# Patient Record
Sex: MALE | Race: WHITE | NOT HISPANIC OR LATINO | Employment: OTHER | URBAN - METROPOLITAN AREA
[De-identification: names, ages, dates, MRNs, and addresses within clinical notes are randomized per-mention and may not be internally consistent; named-entity substitution may affect disease eponyms.]

---

## 2017-01-23 ENCOUNTER — SLEEP CENTER VISIT (OUTPATIENT)
Dept: SLEEP MEDICINE | Facility: MEDICAL CENTER | Age: 72
End: 2017-01-23
Payer: MEDICARE

## 2017-01-23 VITALS
WEIGHT: 215 LBS | RESPIRATION RATE: 16 BRPM | DIASTOLIC BLOOD PRESSURE: 78 MMHG | BODY MASS INDEX: 30.78 KG/M2 | HEIGHT: 70 IN | HEART RATE: 92 BPM | SYSTOLIC BLOOD PRESSURE: 116 MMHG

## 2017-01-23 DIAGNOSIS — G47.33 OBSTRUCTIVE SLEEP APNEA HYPOPNEA, MILD: ICD-10-CM

## 2017-01-23 PROCEDURE — 99214 OFFICE O/P EST MOD 30 MIN: CPT | Performed by: INTERNAL MEDICINE

## 2017-01-23 RX ORDER — ZOLPIDEM TARTRATE 5 MG/1
5 TABLET ORAL NIGHTLY PRN
Qty: 3 TAB | Refills: 0 | Status: SHIPPED | OUTPATIENT
Start: 2017-01-23

## 2017-01-23 NOTE — PATIENT INSTRUCTIONS
1.  We have changed the AutoPap pressure to between 5 and 16 cm of water  2. We have scheduled a CPAP titration study  3. We have prescribed Ambien that can be used as needed for the sleep study

## 2017-01-23 NOTE — PROGRESS NOTES
Chito Pham is a 71 y.o. male here for objective sleep apnea.      History of Present Illness:    The patient is a 71-year-old male who comes in for evaluation of sleep apnea. He is previously seen Dr. Marr for a lung nodule. The plan is for never repeat CT scan of the chest in March 2017. She is a . He had a home sleep study done in 2014 which showed mild sleep apnea with an apnea hypopnea index of 6.3 per hour and a low oxygen saturation of 72%. E he was given a machine to use. It's an AutoPap machine set between 12 and 16 cm water. He has not really used it. However he underwent a colonoscopy recently and he was told by the gastrologist that he had sleep apnea. He says really tried to use the AutoPap machine. He does bring in a  card which shows about 4 days of use and an apnea hypopnea index of 18.8 per hour and he has used the machine on average for 2 hours and 16 minutes. He denies any daytime fatigue or sleepiness. He denies any other types of sleep problems such as restless legs or nocturnal kicking or jerking. He has never had a CPAP titration performed. He has a history of gastroesophageal reflux disease. He scores a 5 on the [place] sleepiness scale. He denies nocturnal leg kicking he denies sleepwalking or seizures and does not physically active his dreams. He typically has no trouble going to sleep and will go to bed at 9:00 at night and falls asleep within 5 minutes and then he'll wake up at 6 the morning feeling rested.    Constitutional:  Negative for fever, chills, sweats, and fatigue.  Eyes:  Negative for eye pain and visual changes.  HENT:  Negative for tinnitus and hoarse voice.  Cardiovascular:  Negative for chest pain, leg swelling, syncope and orthopnea.  Respiratory:  See HPI for pertinent negatives  Sleep:  Negative for somnolence, loud snoring, sleep disturbance due to breathing, insomnia.  Gastrointestinal:  Negative for dysphagia, nausea and abdominal  pain.  Heme/lymph:  Denies easy bruising, blood clots.  Musculoskeletal:  Negative for arthralgias, sore muscles and back pain.  Skin:  Negative for rash and color change.  Neurological:  Negative for headaches, lightheadedness and weakness.  Psychiatric:  Denies depression.    Current Outpatient Prescriptions   Medication Sig Dispense Refill   • zolpidem (AMBIEN) 5 MG Tab Take 1 Tab by mouth at bedtime as needed for Sleep (insomnia). Take 1 tab at bedtime as needed for insomnia. 3 Tab 0   • ursodiol (KARYN 250) 250 MG Tab Take 500 mg by mouth 2 times a day.     • omeprazole (PRILOSEC) 20 MG CPDR Take 40 mg by mouth every day.     • ciprofloxacin (CIPRO) 400 MG/200ML SOLN ivpb 400 mg by Intravenous route every 12 hours.     • HYDROmorphone (DILAUDID) 1 MG/ML SOLN 1 mg by Intravenous route every 3 hours as needed.     • metronidazole (FLAGYL) 5-0.79 MG/ML-% SOLN 500 mg by Intravenous route every 8 hours.     • phytonadione (MEPHYTON) 5 MG TABS Take 10 mg by mouth Once.     • prochlorperazine (COMPAZINE) 5 MG TABS Take 5 mg by mouth 3 times a day as needed.     • pravastatin (PRAVACHOL) 20 MG TABS Take 1.5 Tabs by mouth every evening. (Patient not taking: Reported on 1/23/2017) 135 Each 3   • finasteride (PROSCAR) 5 MG TABS Take 5 mg by mouth every day.     • terazosin (HYTRIN) 2 MG CAPS Take 2 mg by mouth every evening.     • aspirin EC (ECOTRIN) 81 MG TBEC Take 81 mg by mouth every day.     • MULTIPLE VITAMIN PO Take  by mouth.       No current facility-administered medications for this visit.       Social History   Substance Use Topics   • Smoking status: Former Smoker -- 0.50 packs/day for 40 years     Quit date: 08/31/2001   • Smokeless tobacco: Never Used   • Alcohol Use: 0.6 oz/week     1 Glasses of wine per week      Comment: 1 glass of wine each week, occasionally       Past Medical History   Diagnosis Date   • CAD (coronary artery disease) 8/31/2011   • COPD (chronic obstructive pulmonary disease) (CMS-[facility])   "  • Emphysema lung (CMS-HCC)    • Benign prostatic hypertrophy    • Thrombocytopenia (CMS-HCC)    • Pulmonary nodule    • Chickenpox    • French measles    • Mumps    • Influenza    • Tonsillitis        Past Surgical History   Procedure Laterality Date   • Umbilical hernia repair  4/17/08     Performed by MIGUEL A BETANCOURT at [facility] ORS   • Hernia repair       BILATERAL   • Egd with asp/bx  3/13/2012     Performed by RANI NÚÑEZ at SURGERY [facility] ORS   • Gastroscopy with biopsy  3/13/2012     Performed by RANI NÚÑEZ at SURGERY [facility] ORS   • Ercp w/sphincterotomy/papill.  3/13/2012     Performed by RANI NÚÑEZ at [facility] ORS   • Cholecystectomy     • Tonsillectomy     • Appendectomy     • Urology surgery         Allergies:  Review of patient's allergies indicates no known allergies.    Family History   Problem Relation Age of Onset   • Lung Disease Mother    • Lung Cancer Mother    • Lung Disease Father    • Respiratory Disease Father        Physical Examination    Filed Vitals:    01/23/17 1318   Height: 1.778 m (5' 10\")   Weight: 97.523 kg (215 lb)   Weight % change since last entry.: 0 %   BP: 116/78   Pulse: 92   BMI (Calculated): 30.85   Resp: 16   O2 sat % room air: 96 %   Neck circumference: 16       Physical Exam:  Constitutional:  Well developed and well nourished.  Head:  Normocephalic and atraumatic.  Nose:  Nose normal.  Mouth/Throat:  Oropharynx is clear and moist, no lesions.    Neck:  Normal range of motion.  Supple.  No JVD.  Cardiovascular:  Normal rate, regular rhythm, normal heart sounds. No edema  Pulmonary/Chest: No wheezing, rales or rhonchi.  Respiratory effort non labored  Musculoskeletal.  No muscular atrophy.  Lymphadenopathy:  No cervical or supraclavicular adenopathy  Neurological:  Alert and oriented.  Cranial nerves intact.  No focal deficits  Skin:  No rashes or ulcers.  Psyciatric:  Normal mood and affect.      Assessment and " Plan:  1. Obstructive sleep apnea hypopnea, mild  The patient has mild sleep apnea diagnosed on a home study 3 years ago. I suspect that his sleep apnea is more significant at this time. The patient would like to be treated and is willing to use CPAP therapy. I made adjustments to his AutoPap machine starting at 5 cm of water and up to 16 cm of water. I've also scheduled him for CPAP titration. We will make adjustments to his CPAP machine as necessary based on the results of the sleep study. He was advised to avoid alcohol and sedatives and to not operate a motor vehicle while drowsy. He was advised that he needed to comply with FAA rules. We will see him back after the sleep study.  - POLYSOMNOGRAPHY TITRATION; Future  - zolpidem (AMBIEN) 5 MG Tab; Take 1 Tab by mouth at bedtime as needed for Sleep (insomnia). Take 1 tab at bedtime as needed for insomnia.  Dispense: 3 Tab; Refill: 0        Followup Return for after sleep study, follow up with the sleep physician.    Answers for HPI/ROS submitted by the patient on 1/19/2017   Year of your last physical exam: 2016  Results of exam: good  Occupation : Retired  Height: 5'10''  Current weight: 210  6 months ago: 210  At age 21: 185  What is the reason for your visit today?: consultation  Name of person referring you to the Sleep Center: Houston Valdez  Have you ever been hospitalized?: Yes  Reason, year, and hospital in which you were hospitalized:: Gallstones  Have you ever had problems with anesthesia?: No  Have you experienced post-operative delirium?: No  Any complications with surgery?: No  What year did you receive your last Flu shot?: 2016  What year did you receive you last Pneumonia shot?: 2016  Please briefly describe your sleep problem and how old you were when it began.: MARTHA  How does this affect your daily life and activities? Please also rate how serious of a problem this is (1 = Not at all, 10 = Very Serious).: 2  Have you had any previous evaluations,  examinations, or treatment for this sleep problem or any other problems with your sleep? If so, please describe the evaluation, treatment, and results.: CPAP  What time do you usually go to bed and wake up on: Weekdays? Weekends?: 10PM 6AM  Do you have a regular bed partner?: Yes  How many minutes does it usually take to fall asleep at night after turning off the lights?: 5-10  What do you ordinarily do just prior to turning out the lights and attempting to go to sleep (e.g., reading, TV, baths, etc.)?: TV  On average, how many times do you wake up during the night?: 5  On average, how many times do you wake up to use the bathroom?: 1  Do you often wake up too early in the morning and are unable to return to sleep?: no  On average, how many hours of sleep do you get per night?: 5-6  How do you usually awaken?  Alarm, spontaneously, or other?: spontaneous  Is it difficult for you to awaken and get out of bed after sleeping? (Not at all, Sometimes, Very): no  Do you nap or return to bed after arising?: no  Are you bothered by sleepiness during the day?: no  Do you feel that you get too much sleep at night?: no  Do you feel that you get too little sleep at night?: sometimes  Do you usually feel tired during the day? If so, what do you attribute this to?: no  Do you find yourself falling asleep when you don't mean to? : no  Have you ever suddenly fallen?: no  Have you ever experienced sudden body weakness?: no  Have you ever experienced weakness or paralysis upon going to sleep?: no  Have you ever experienced weakness or paralysis upon awakening from sleep?: no  Have you ever experienced seeing things or hearing voices/noises: That weren't real? On going to sleep? During the night? On awakening from sleep? During the day?: no  Do you have difficulty breathing at night? If yes, briefly describe.: no  Have you been told you snore while asleep? If so, does it disturb a bed partner (or someone in the same room), or someone  "in the next room?: yes  Have you ever experienced doing something without being aware of the action? If yes, please describe.: no  Have you ever experienced upon lying in bed before sleep or on awakening from sleep: Restlessness of legs, \"nervous legs\", \"creeping crawling\" sensation of legs, or twitching of legs?: no  Have you ever been told that your arms or legs jerk or twitch while you are asleep? If yes, how many times per night does this occur?: no  Do you know, or have you ever been told that you do any of the following while sleeping: talk, walk, grit teeth, wet the bed, wake up screaming or seemingly afraid, have disturbing dreams, have unusual movements, wake up with headaches, (males) have erections? If yes to any of these, please indicate how many times per week, age started, last occurrence, treatment received.: no  Has anyone in your family been known to have any sleep problems? If yes, please list the type of problem (e.g., trouble getting to sleep, too sleepy, bed wetting, etc.), the relationship of this person to you, and the treatment received.: no      "

## 2017-01-23 NOTE — MR AVS SNAPSHOT
"Chito Pham   2017 1:20 PM   Sleep Center Visit   MRN: [id number]    Department:  [facility]   Dept Phone:  [phone]    Description:  Male : 1945   Provider:  Mal Turk M.D.           Reason for Visit     New Patient MARTHA      Allergies as of 2017     No Known Allergies      You were diagnosed with     Obstructive sleep apnea hypopnea, mild   [[id number]]         Vital Signs     Blood Pressure Pulse Respirations Height Weight Body Mass Index    116/78 mmHg 92 16 1.778 m (5' 10\") 97.523 kg (215 lb) 30.85 kg/m2    Smoking Status                   Former Smoker           Basic Information     Date Of Birth Sex Race Ethnicity Preferred Language    1945 Male White Non- English      Your appointments     2017  7:05 PM   Sleep Study with SLEEP TECH   [facility] (--)    [address]  [place] [id number] [phone]            2017  3:20 PM   Follow UP with [facility] (--)    [address]  [place] [id number] [phone]              Problem List              ICD-10-CM Priority Class Noted - Resolved    CAD (coronary artery disease) I25.10   2011 - Present    Dyslipidemia E78.5   2011 - Present      Health Maintenance        Date Due Completion Dates    IMM DTaP/Tdap/Td Vaccine (1 - Tdap) 1965 ---    COLONOSCOPY 1996 ---    IMM ZOSTER VACCINE 2006 ---    IMM PNEUMOCOCCAL 65+ (ADULT) LOW/MEDIUM RISK SERIES (1 of 2 - PCV13) 2011 ---    IMM INFLUENZA (1) 2016 ---            Current Immunizations     No immunizations on file.      Below and/or attached are the medications your provider expects you to take. Review all of your home medications and newly ordered medications with your provider and/or pharmacist. Follow medication instructions as directed by your provider and/or pharmacist. Please keep your medication list with " you and share with your provider. Update the information when medications are discontinued, doses are changed, or new medications (including over-the-counter products) are added; and carry medication information at all times in the event of emergency situations     Allergies:  No Known Allergies          Medications  Valid as of: January 23, 2017 -  2:24 PM    Generic Name Brand Name Tablet Size Instructions for use    Aspirin (Tablet Delayed Response) ECOTRIN 81 MG Take 81 mg by mouth every day.        Ciprofloxacin in D5W (Solution) CIPRO 400 MG/200ML 400 mg by Intravenous route every 12 hours.        Finasteride (Tab) PROSCAR 5 MG Take 5 mg by mouth every day.        HYDROmorphone HCl (Solution) DILAUDID 1 MG/ML 1 mg by Intravenous route every 3 hours as needed.        MetroNIDAZOLE in NaCl (Solution) FLAGYL 5-0.79 MG/ML-% 500 mg by Intravenous route every 8 hours.        Multiple Vitamin   Take  by mouth.        Omeprazole (CAPSULE DELAYED RELEASE) PRILOSEC 20 MG Take 40 mg by mouth every day.        Phytonadione (Tab) MEPHYTON 5 MG Take 10 mg by mouth Once.        Pravastatin Sodium (Tab) PRAVACHOL 20 MG Take 1.5 Tabs by mouth every evening.        Prochlorperazine Maleate (Tab) COMPAZINE 5 MG Take 5 mg by mouth 3 times a day as needed.        Terazosin HCl (Cap) HYTRIN 2 MG Take 2 mg by mouth every evening.        Ursodiol (Tab) KARYN 250 250 MG Take 500 mg by mouth 2 times a day.        Zolpidem Tartrate (Tab) AMBIEN 5 MG Take 1 Tab by mouth at bedtime as needed for Sleep (insomnia). Take 1 tab at bedtime as needed for insomnia.        .                 Medicines prescribed today were sent to:     [facility] - [address]    [facility] [place] [address]    Phone: [phone] Fax: [phone]    Open 24 Hours?: No      Medication refill instructions:       If your prescription bottle indicates you have medication refills left, it is not necessary to call your provider’s  office. Please contact your pharmacy and they will refill your medication.    If your prescription bottle indicates you do not have any refills left, you may request refills at any time through one of the following ways: The online reportbrain system (except Urgent Care), by calling your provider’s office, or by asking your pharmacy to contact your provider’s office with a refill request. Medication refills are processed only during regular business hours and may not be available until the next business day. Your provider may request additional information or to have a follow-up visit with you prior to refilling your medication.   *Please Note: Medication refills are assigned a new Rx number when refilled electronically. Your pharmacy may indicate that no refills were authorized even though a new prescription for the same medication is available at the pharmacy. Please request the medicine by name with the pharmacy before contacting your provider for a refill.        Your To Do List     Future Labs/Procedures Complete By Expires    POLYSOMNOGRAPHY TITRATION  As directed 1/23/2018      Instructions    1.  We have changed the AutoPap pressure to between 5 and 16 cm of water  2. We have scheduled a CPAP titration study  3. We have prescribed Ambien that can be used as needed for the sleep study          reportbrain Access Code: Activation code not generated  Current reportbrain Status: Active

## 2017-02-13 ENCOUNTER — TELEPHONE (OUTPATIENT)
Dept: SLEEP MEDICINE | Facility: MEDICAL CENTER | Age: 72
End: 2017-02-13

## 2017-02-13 DIAGNOSIS — R91.8 ABNORMAL CT SCAN, LUNG: ICD-10-CM

## 2017-02-13 NOTE — TELEPHONE ENCOUNTER
Pt is scheduled for a CT on 2/23/17, they are requiring BUN and Creatinine blood work before he can get CT. Please place order for labs, he is going to a [facility] lab. Thank you.

## 2017-02-19 ENCOUNTER — SLEEP STUDY (OUTPATIENT)
Dept: SLEEP MEDICINE | Facility: MEDICAL CENTER | Age: 72
End: 2017-02-19
Attending: INTERNAL MEDICINE
Payer: MEDICARE

## 2017-02-19 DIAGNOSIS — G47.33 OBSTRUCTIVE SLEEP APNEA HYPOPNEA, MILD: ICD-10-CM

## 2017-02-19 PROCEDURE — 95811 POLYSOM 6/>YRS CPAP 4/> PARM: CPT | Performed by: INTERNAL MEDICINE

## 2017-02-21 ENCOUNTER — HOSPITAL ENCOUNTER (OUTPATIENT)
Dept: LAB | Facility: MEDICAL CENTER | Age: 72
End: 2017-02-21
Attending: NURSE PRACTITIONER
Payer: MEDICARE

## 2017-02-21 DIAGNOSIS — R91.8 ABNORMAL CT SCAN, LUNG: ICD-10-CM

## 2017-02-21 LAB
BUN SERPL-MCNC: 18 MG/DL (ref 8–22)
CREAT SERPL-MCNC: 1.07 MG/DL (ref 0.5–1.4)

## 2017-02-21 PROCEDURE — 82565 ASSAY OF CREATININE: CPT

## 2017-02-21 PROCEDURE — 36415 COLL VENOUS BLD VENIPUNCTURE: CPT

## 2017-02-21 PROCEDURE — 84520 ASSAY OF UREA NITROGEN: CPT

## 2017-02-21 NOTE — PROCEDURES
Clinical Comments:  The patient underwent a comprehensive polysomnogram using the standard montage for measurement of parameters of sleep, respiratory events, movement abnormalities, heart rate and rhythm. A microphone was used to monitor snoring.      INTERPRETATION:  The total recording time was 380.7 minutes with a sleep period of 373.6 minutes and the total sleep time was 251.5 minutes with a sleep efficiency of 66.1%.  The sleep latency was 7.1 minutes, and REM latency was 78.0 minutes.  The patient experienced 179 arousals in total, for an arousal index of 42.7    RESPIRATORY: The patient had 54 apneas in total.  Of these, 0 were obstructive apneas, and 54 were central apneas.  This resulted in an apnea index (AI) of 12.9.  The patient had 18 hypopneas, for a hypopnea index of 4.3.  The overall AHI was 17.2, while the AHI during Stage R sleep was 4.5.  AHI while supine was 20.8.    OXIMETRY: Oxygen saturation monitoring showed a mean SpO2 of 94.2%, with a minimum oxygen saturation of 89.0%.  Oxygen saturations were less than or = 89% for 0.0 minutes of sleep time.    CARDIAC: The highest heart rate during the recording was 97.0 beats per minute.  The average heart rate during sleep was 74.8 bpm.    LIMB MOVEMENTS: There were a total of 340 PLMs during sleep, of which 85 were PLMs arousals.  This resulted in a PLMS index of 81.1.    CPAP was tried from 5cm to 9cm H2O. Bilevel was tried from 12/8cm H2O to a final pressure of 19/14cm H2O.     Sleep study was accomplished on February 19, 2017. CPAP titration ranged between 5 and 9 cm, and then bilevel was utilized up to 19/14. This complex titration study yielded best results at the higher levels of BiPAP, at 19/14 the patient's index was zero, saturations were maintained and REM sleep was achieved.

## 2017-02-23 ENCOUNTER — HOSPITAL ENCOUNTER (OUTPATIENT)
Dept: RADIOLOGY | Facility: MEDICAL CENTER | Age: 72
End: 2017-02-23
Attending: INTERNAL MEDICINE
Payer: MEDICARE

## 2017-02-23 DIAGNOSIS — R93.89 ABNORMAL CHEST X-RAY: ICD-10-CM

## 2017-02-23 PROCEDURE — 71260 CT THORAX DX C+: CPT

## 2017-02-23 PROCEDURE — 700117 HCHG RX CONTRAST REV CODE 255: Performed by: INTERNAL MEDICINE

## 2017-02-23 RX ADMIN — IOHEXOL 75 ML: 350 INJECTION, SOLUTION INTRAVENOUS at 13:14

## 2017-02-27 ENCOUNTER — SLEEP CENTER VISIT (OUTPATIENT)
Dept: SLEEP MEDICINE | Facility: MEDICAL CENTER | Age: 72
End: 2017-02-27
Payer: MEDICARE

## 2017-02-27 VITALS
TEMPERATURE: 98.1 F | WEIGHT: 217 LBS | HEART RATE: 95 BPM | SYSTOLIC BLOOD PRESSURE: 104 MMHG | OXYGEN SATURATION: 96 % | DIASTOLIC BLOOD PRESSURE: 70 MMHG | HEIGHT: 70 IN | RESPIRATION RATE: 16 BRPM | BODY MASS INDEX: 31.07 KG/M2

## 2017-02-27 DIAGNOSIS — R93.89 ABNORMAL CHEST X-RAY: ICD-10-CM

## 2017-02-27 DIAGNOSIS — G47.33 OBSTRUCTIVE SLEEP APNEA SYNDROME: ICD-10-CM

## 2017-02-27 DIAGNOSIS — G47.10 HYPERSOMNOLENCE: ICD-10-CM

## 2017-02-27 PROCEDURE — 99214 OFFICE O/P EST MOD 30 MIN: CPT | Performed by: INTERNAL MEDICINE

## 2017-02-27 RX ORDER — AMPICILLIN TRIHYDRATE 250 MG
CAPSULE ORAL
Status: ON HOLD | COMMUNITY
End: 2021-06-18

## 2017-02-27 RX ORDER — DIMENHYDRINATE 50 MG
TABLET ORAL
COMMUNITY
End: 2021-06-09

## 2017-02-27 NOTE — MR AVS SNAPSHOT
"        Chito Pham   2017 3:20 PM   Sleep Center Visit   MRN: [id number]    Department:  [facility]   Dept Phone:  [phone]    Description:  Male : 1945   Provider:  Tyler DYE M.D.           Reason for Visit     Results CT Chest, BUN/Creatinine, Sleep Study      Allergies as of 2017     No Known Allergies      You were diagnosed with     Obstructive sleep apnea syndrome   [339774]       Hypersomnolence   [[id number]]       Abnormal chest x-ray   [[id number]]         Vital Signs     Blood Pressure Pulse Temperature Respirations Height Weight    104/70 mmHg 95 36.7 °C (98.1 °F) 16 1.778 m (5' 10\") 98.431 kg (217 lb)    Body Mass Index Oxygen Saturation Smoking Status             31.14 kg/m2 96% Former Smoker         Basic Information     Date Of Birth Sex Race Ethnicity Preferred Language    1945 Male White Non- English      Problem List              ICD-10-CM Priority Class Noted - Resolved    CAD (coronary artery disease) I25.10   2011 - Present    Dyslipidemia E78.5   2011 - Present      Health Maintenance        Date Due Completion Dates    IMM DTaP/Tdap/Td Vaccine (1 - Tdap) 1965 ---    COLONOSCOPY 1996 ---    IMM ZOSTER VACCINE 2006 ---    IMM PNEUMOCOCCAL 65+ (ADULT) LOW/MEDIUM RISK SERIES (1 of 2 - PCV13) 2011 ---    IMM INFLUENZA (1) 2016 ---            Current Immunizations     No immunizations on file.      Below and/or attached are the medications your provider expects you to take. Review all of your home medications and newly ordered medications with your provider and/or pharmacist. Follow medication instructions as directed by your provider and/or pharmacist. Please keep your medication list with you and share with your provider. Update the information when medications are discontinued, doses are changed, or new medications (including over-the-counter products) are added; and carry medication information at all times " in the event of emergency situations     Allergies:  No Known Allergies          Medications  Valid as of: February 27, 2017 -  4:18 PM    Generic Name Brand Name Tablet Size Instructions for use    B Complex Vitamins   Place  under tongue.        Flaxseed (Linseed) (Cap) Flax Seed Oil 1000 MG Take  by mouth.        Inulin   Take  by mouth.        Magnesium (Cap) Magnesium 100 MG Take  by mouth.        Multiple Vitamin   Take  by mouth.        Niacin   Take  by mouth.        Omeprazole (CAPSULE DELAYED RELEASE) PRILOSEC 20 MG Take 40 mg by mouth 2 times a day.        Pravastatin Sodium (Tab) PRAVACHOL 20 MG Take 1.5 Tabs by mouth every evening.        Red Yeast Rice Extract (Cap) Red Yeast Rice 600 MG Take  by mouth.        Ursodiol (Tab) KARYN 250 250 MG Take 250 mg by mouth 4 times a day.        Zolpidem Tartrate (Tab) AMBIEN 5 MG Take 1 Tab by mouth at bedtime as needed for Sleep (insomnia). Take 1 tab at bedtime as needed for insomnia.        .                 Medicines prescribed today were sent to:     [facility] [address] [place] [address] [place] - [id number] [facility] [address]    Phone: [phone] Fax: [phone]    Open 24 Hours?: No      Medication refill instructions:       If your prescription bottle indicates you have medication refills left, it is not necessary to call your provider’s office. Please contact your pharmacy and they will refill your medication.    If your prescription bottle indicates you do not have any refills left, you may request refills at any time through one of the following ways: The online Gate2Play system (except Urgent Care), by calling your provider’s office, or by asking your pharmacy to contact your provider’s office with a refill request. Medication refills are processed only during regular business hours and may not be available until the next business day. Your provider may request additional information or to have a follow-up visit with you prior to  refilling your medication.   *Please Note: Medication refills are assigned a new Rx number when refilled electronically. Your pharmacy may indicate that no refills were authorized even though a new prescription for the same medication is available at the pharmacy. Please request the medicine by name with the pharmacy before contacting your provider for a refill.        Your To Do List     Future Labs/Procedures Complete By Expires    CT-CHEST (THORAX) WITH  As directed 10/15/2017    Scheduling Instructions:    Schedule for October, 2017 before next office visit.         DataRobot Access Code: Activation code not generated  Current DataRobot Status: Active

## 2017-02-28 NOTE — PROGRESS NOTES
CC:  Sleep apnea hypopnea syndrome, abnormal chest CT scan.    HPI: Mr. Pham has a history of obstructive sleep apnea hypopnea syndrome. Diagnostic polysomnography on May 21, 2014 demonstrated an apnea hypopnea index of 6.3 events per hour but a minimum arterial oxygen saturation of 72% on room air. He had been treated with auto titrating CPAP using a pressure range of 5-20 cm water but felt that the peak pressure was too high. At the last office visit he was empirically adjusted down to a pressure range of 5-16 cm water and titration polysomnography was arranged. That study on February 19, 2007 demonstrated relatively few events at CPAP pressures of 5-8 cm water including supine and REM sleep time. As the CPAP pressure was increased some central apnea episodes appeared prompting the institution of BiPAP therapy, increased to a final pressure of 19/14 cm water.. In reviewing the study data, I think that central apneas primarily represented an overtreatment effect.    He has continued to use the auto titrating CPAP each night, throughout the night. He is not having usual problems with fit, leakage or airway dryness. The CPAP data recording chip information is reviewed with the patient. It demonstrates use on 29 of the last 30 days with an average daily usage of 7 hours and 40 minutes. The mean pressure is 6.3 cm water with an average peak pressure of 7.6. There is some leak but the estimated residual apnea hypopnea index is only 4.3 events per hour. He does not have severe daytime somnolence. He does nap occasionally but does not fall asleep inappropriately.    He is also followed for an abnormal chest CT scan. He was initially evaluated after a clinical episode of pneumonia early in 2014. A CT scan of the chest on March 18, 2014 demonstrated an 18 mm spiculated density in the right suprahilar area. A follow-up CT scan on August 27, 2014 demonstrated a reduction in the scope and density of the right suprahilar  abnormality with a maximum diameter of 9 cm. Resolving pneumonia was suspected. A follow-up CT scan on February 18, 2015 suggested a possible slight increase in nodule diameter to 10 mm but follow-up scans on February 22, 2016 and July 6, 2016 demonstrated stability in the nodule. The most recent CT scan dated February 23, 2017 again demonstrates a 10 mm spiculated right upper lobe nodule unchanged compared with the prior study. In addition there is a stable subpleural 3 mm left lower lobe nodule and a 4 mm right lower lobe subpleural nodule which are both unchanged since June 16, 2008. There is still minimal right middle lobe scarring or possible bronchiectasis and no significant adenopathy or effusion. On cuts through the upper abdomen, the splenic diameter remains a bit enlarged at 15 cm.     He is followed by Dr. Ochoa for pancytopenia and possible myelodysplastic syndrome. He is scheduled for another bone marrow biopsy in the near future. He's had no bleeding or recent symptoms of infection.          Patient Active Problem List    Diagnosis Date Noted   • CAD (coronary artery disease) 08/31/2011   • Dyslipidemia 08/31/2011       Past Medical History   Diagnosis Date   • CAD (coronary artery disease) 8/31/2011   • COPD (chronic obstructive pulmonary disease) (CMS-HCC)    • Emphysema lung (CMS-HCC)    • Benign prostatic hypertrophy    • Thrombocytopenia (CMS-HCC)    • Pulmonary nodule    • Chickenpox    • [place] measles    • Mumps    • Influenza    • Tonsillitis        Past Surgical History   Procedure Laterality Date   • Umbilical hernia repair  4/17/08     Performed by MIGUEL A BETANCOURT at SURGERY [facility] ORS   • Hernia repair       BILATERAL   • Egd with asp/bx  3/13/2012     Performed by RANI NÚÑEZ at SURGERY [facility] ORS   • Gastroscopy with biopsy  3/13/2012     Performed by RANI NÚÑEZ at SURGERY [facility] ORS   • Ercp w/sphincterotomy/papill.  3/13/2012     Performed by  "RANI NÚÑEZ at SURGERY [facility] ORS   • Cholecystectomy     • Tonsillectomy     • Appendectomy     • Urology surgery         Family History   Problem Relation Age of Onset   • Lung Disease Mother    • Lung Cancer Mother    • Lung Disease Father    • Respiratory Disease Father        Social History     Social History   • Marital Status:      Spouse Name: N/A   • Number of Children: N/A   • Years of Education: N/A     Occupational History   • Not on file.     Social History Main Topics   • Smoking status: Former Smoker -- 0.50 packs/day for 40 years     Quit date: 08/31/2001   • Smokeless tobacco: Never Used   • Alcohol Use: 0.6 oz/week     1 Glasses of wine per week      Comment: 1 glass of wine each week, occasionally   • Drug Use: No   • Sexual Activity: Not on file     Other Topics Concern   • Not on file     Social History Narrative       Current Outpatient Prescriptions   Medication Sig Dispense Refill   • Red Yeast Rice 600 MG Cap Take  by mouth.     • B Complex Vitamins (B-COMPLEX/B-12 SL) Place  under tongue.     • Inulin (FIBER CHOICE PO) Take  by mouth.     • Niacin (VITAMIN B-3 PO) Take  by mouth.     • Magnesium 100 MG Cap Take  by mouth.     • Flaxseed, Linseed, (FLAX SEED OIL) 1000 MG Cap Take  by mouth.     • ursodiol (KARYN 250) 250 MG Tab Take 250 mg by mouth 4 times a day.     • omeprazole (PRILOSEC) 20 MG CPDR Take 40 mg by mouth 2 times a day.     • MULTIPLE VITAMIN PO Take  by mouth.     • zolpidem (AMBIEN) 5 MG Tab Take 1 Tab by mouth at bedtime as needed for Sleep (insomnia). Take 1 tab at bedtime as needed for insomnia. (Patient not taking: Reported on 2/27/2017) 3 Tab 0   • pravastatin (PRAVACHOL) 20 MG TABS Take 1.5 Tabs by mouth every evening. (Patient not taking: Reported on 1/23/2017) 135 Each 3     No current facility-administered medications for this visit.    \"CURRENT RX\"      Allergies: Review of patient's allergies indicates no known allergies.      ROS  Unchanged from " "prior notable for the sleep, respiratory, cardiac and hematologic issues reviewed above.      Physical Exam:   /70 mmHg  Pulse 95  Temp(Src) 36.7 °C (98.1 °F)  Resp 16  Ht 1.778 m (5' 10\")  Wt 98.431 kg (217 lb)  BMI 31.14 kg/m2  SpO2 96%   Head and neck examination demonstrates no mucosal lesion, purulent drainage or evident polyps. The pharynx is benign with a Mallampati I presentation. The neck is supple without thyromegaly. On chest examination there are symmetrical bilateral breath sounds without rales, wheezing or consolidation. On cardiac examination, the apical impulse and heart sounds are normal and the rhythm is regular. There is no murmur, gallop or rub and no jugular venous distention. The abdomen is soft with active bowel sounds and no palpable hepatosplenomegaly, mass, guarding or rebound. The extremities show no clubbing, cyanosis or edema and no signs of deep venous thrombosis. There is no warmth, redness, tenderness or palpable venous cord in the calves. The skin is clear, warm and dry. There is no unusual peripheral lymphadenopathy. Peripheral pulses are palpable in all 4 extremities. On neurologic examination, cranial nerve function is intact, motor tone is symmetrical, and the patient is alert, oriented and responsive.       Problems:  1. Obstructive sleep apnea syndrome  He has mild obstructive sleep apnea hypopnea syndrome, confirmed by polysomnography and associated with significant hypoxemia. He is doing well on auto titrating CPAP, using the machine regularly as confirmed by the data recording chip. He has reasonable levels of daytime alertness. As described above, I think that the central apnea episodes seen in the recent titration polysomnogram represent an overtreatment effect. His clinical response and the CPAP data recording chip suggest that CPAP pressures of 5-8 cm water should be sufficient to correct his sleep-disordered breathing.    2. Hypersomnolence  Improved with " effective treatment of sleep-disordered breathing.    3. Abnormal chest x-ray  The right suprahilar density was first noticed after an episode of clinical pneumonia and improved on follow-up imaging. That abnormality has remained essentially stable now for nearly 3 years but there is some residual concern that there may have been a slight increase in nodule diameter from 9-10 mm last year. Given the three-year timeframe I think that a slowly growing neoplasm is unlikely but further follow-up would be prudent.      Plan:   1. Adjust the auto titrating CPAP pressure to a range of 5-14 cm water. A prescription is written for new CPAP mask and supplies as needed.    2. Follow-up CT scan of the chest in October 2017 with office follow-up afterwards.    We appreciate the opportunity to assist in his care.      CC: Elías Ochoa M.D., [facility]

## 2017-04-25 ENCOUNTER — HOSPITAL ENCOUNTER (OUTPATIENT)
Dept: LAB | Facility: MEDICAL CENTER | Age: 72
End: 2017-04-25
Attending: PODIATRIST
Payer: MEDICARE

## 2017-04-25 LAB — URATE SERPL-MCNC: 5.4 MG/DL (ref 2.5–8.3)

## 2017-04-25 PROCEDURE — 84550 ASSAY OF BLOOD/URIC ACID: CPT

## 2017-04-25 PROCEDURE — 36415 COLL VENOUS BLD VENIPUNCTURE: CPT

## 2017-09-15 DIAGNOSIS — R93.89 ABNORMAL CHEST X-RAY: ICD-10-CM

## 2017-09-28 ENCOUNTER — HOSPITAL ENCOUNTER (OUTPATIENT)
Dept: RADIOLOGY | Facility: MEDICAL CENTER | Age: 72
End: 2017-09-28
Attending: INTERNAL MEDICINE
Payer: MEDICARE

## 2017-09-28 DIAGNOSIS — R93.89 ABNORMAL CHEST X-RAY: ICD-10-CM

## 2017-09-28 PROCEDURE — 71260 CT THORAX DX C+: CPT

## 2017-10-30 ENCOUNTER — SLEEP CENTER VISIT (OUTPATIENT)
Dept: SLEEP MEDICINE | Facility: MEDICAL CENTER | Age: 72
End: 2017-10-30
Payer: MEDICARE

## 2017-10-30 VITALS
OXYGEN SATURATION: 94 % | TEMPERATURE: 98 F | WEIGHT: 218 LBS | SYSTOLIC BLOOD PRESSURE: 124 MMHG | DIASTOLIC BLOOD PRESSURE: 72 MMHG | HEIGHT: 70 IN | BODY MASS INDEX: 31.21 KG/M2 | RESPIRATION RATE: 16 BRPM | HEART RATE: 82 BPM

## 2017-10-30 DIAGNOSIS — R93.89 ABNORMAL CHEST X-RAY: ICD-10-CM

## 2017-10-30 DIAGNOSIS — G47.33 OBSTRUCTIVE SLEEP APNEA SYNDROME: ICD-10-CM

## 2017-10-30 DIAGNOSIS — G47.10 HYPERSOMNOLENCE: ICD-10-CM

## 2017-10-30 PROCEDURE — 99214 OFFICE O/P EST MOD 30 MIN: CPT | Performed by: INTERNAL MEDICINE

## 2017-11-08 ENCOUNTER — TELEPHONE (OUTPATIENT)
Dept: SLEEP MEDICINE | Facility: MEDICAL CENTER | Age: 72
End: 2017-11-08

## 2017-11-09 NOTE — TELEPHONE ENCOUNTER
Patient left a voicemail asking about a RX?    Returned patient call, left voicemail.  Unknown medicine.

## 2017-11-09 NOTE — TELEPHONE ENCOUNTER
Spoke with patient.  He hasn't heard from [facility] yet for his supplies.  He will follow up with them this morning.    He'll call back if the RX needs to be resent to [facility] or if he wants to switchout to [facility].

## 2017-11-18 NOTE — PROGRESS NOTES
CC:  Sleep apnea hypopnea syndrome, abnormal chest CT scan.     HPI: Mr. Pham has a history of obstructive sleep apnea hypopnea syndrome. Diagnostic polysomnography on May 21, 2014 demonstrated an apnea hypopnea index of 6.3 events per hour but a minimum arterial oxygen saturation of 72% on room air. He had been treated with auto titrating CPAP using a pressure range of 5-20 cm water but felt that the peak pressure was too high. At the last office visit he was empirically adjusted down to a pressure range of 5-16 cm water and titration polysomnography was arranged. That study on February 19, 2007 demonstrated relatively few events at CPAP pressures of 5-8 cm water including supine and REM sleep time. As the CPAP pressure was increased some central apnea episodes appeared prompting the institution of BiPAP therapy, increased to a final pressure of 19/14 cm water.. In reviewing the study data, I think that central apneas primarily represented an overtreatment effect.     He has continued to use the auto titrating CPAP each night, throughout the night. He is not having usual problems with fit, leakage or airway dryness. The CPAP data recording chip information is reviewed with the patient. It demonstrates use on each of the last 30 days with an average daily usage of 7 hours and 55 minutes. The mean pressure is 6.3 cm water with an average peak pressure of 9.3. There is some leak but the estimated residual apnea hypopnea index is only 4.1 events per hour. He does not have severe daytime somnolence. He does nap occasionally but does not fall asleep inappropriately.     He is also followed for an abnormal chest CT scan. He was initially evaluated after a clinical episode of pneumonia early in 2014. A CT scan of the chest on March 18, 2014 demonstrated an 18 mm spiculated density in the right suprahilar area. A follow-up CT scan on August 27, 2014 demonstrated a reduction in the scope and density of the right  suprahilar abnormality with a maximum diameter of 9 cm. Resolving pneumonia was suspected. A follow-up CT scan on February 18, 2015 suggested a possible slight increase in nodule diameter to 10 mm but follow-up scans on February 22, 2016 and July 6, 2016 demonstrated stability in the nodule. A CT scan dated February 23, 2017 again demonstrates a 10 mm spiculated right upper lobe nodule unchanged compared with the prior study. In addition there was a stable subpleural 3 mm left lower lobe nodule and a 4 mm right lower lobe subpleural nodule, both unchanged since June 16, 2008. There is still minimal right middle lobe scarring or possible bronchiectasis and no significant adenopathy or effusion. On cuts through the upper abdomen, the splenic diameter remained a bit enlarged at 15 cm. The most recent CT scan of the chest on September 28, 2017 demonstrates an unchanged 10 mm spiculated pulmonary nodule in the medial aspect of the right upper lobe and the previously identified 6 mm right lower lobe subpleural nodule and 3 mm noncalcified subpleural left lower lobe nodule, both unchanged.  There are no new nodules or masses.  The minimal change in the medial aspect of the right middle lobe has completely resolved and there is no unusual adenopathy.     He is followed by Dr. Ochoa for pancytopenia and possible myelodysplastic syndrome. He's had no bleeding or recent symptoms of infection.        Patient Active Problem List    Diagnosis Date Noted   • CAD (coronary artery disease) 08/31/2011   • Dyslipidemia 08/31/2011       Past Medical History:   Diagnosis Date   • Benign prostatic hypertrophy    • CAD (coronary artery disease) 8/31/2011   • Chickenpox    • COPD (chronic obstructive pulmonary disease) (CMS-HCC)    • Emphysema lung (CMS-HCC)    • [place] measles    • Influenza    • Mumps    • Pulmonary nodule    • Thrombocytopenia (CMS-HCC)    • Tonsillitis        Past Surgical History:   Procedure Laterality Date   • EGD  WITH ASP/BX  3/13/2012    Performed by RANI NÚÑEZ at SURGERY [facility] ORS   • GASTROSCOPY WITH BIOPSY  3/13/2012    Performed by RANI NÚÑEZ at SURGERY [facility] ORS   • ERCP W/SPHINCTEROTOMY/PAPILL.  3/13/2012    Performed by RANI NÚÑEZ at SURGERY [facility] ORS   • UMBILICAL HERNIA REPAIR  4/17/08    Performed by MIGUEL A BETANCOURT at SURGERY SAME DAY [facility] ORS   • APPENDECTOMY     • CHOLECYSTECTOMY     • HERNIA REPAIR      BILATERAL   • TONSILLECTOMY     • UROLOGY SURGERY         Family History   Problem Relation Age of Onset   • Lung Disease Mother    • Lung Cancer Mother    • Lung Disease Father    • Respiratory Disease Father        Social History     Social History   • Marital status:      Spouse name: N/A   • Number of children: N/A   • Years of education: N/A     Occupational History   • Not on file.     Social History Main Topics   • Smoking status: Former Smoker     Packs/day: 0.50     Years: 40.00     Quit date: 8/31/2001   • Smokeless tobacco: Never Used   • Alcohol use 0.6 oz/week     1 Glasses of wine per week      Comment: 1 glass of wine each week, occasionally   • Drug use: No   • Sexual activity: Not on file     Other Topics Concern   • Not on file     Social History Narrative   • No narrative on file       Current Outpatient Prescriptions   Medication Sig Dispense Refill   • Red Yeast Rice 600 MG Cap Take  by mouth.     • B Complex Vitamins (B-COMPLEX/B-12 SL) Place  under tongue.     • Inulin (FIBER CHOICE PO) Take  by mouth.     • Niacin (VITAMIN B-3 PO) Take  by mouth.     • Magnesium 100 MG Cap Take  by mouth.     • Flaxseed, Linseed, (FLAX SEED OIL) 1000 MG Cap Take  by mouth.     • ursodiol (KARYN 250) 250 MG Tab Take 250 mg by mouth 4 times a day. RX     • omeprazole (PRILOSEC) 20 MG CPDR Take 40 mg by mouth 2 times a day. RX     • MULTIPLE VITAMIN PO Take  by mouth.     • zolpidem (AMBIEN) 5 MG Tab Take 1 Tab by mouth at bedtime as needed for Sleep  "(insomnia). Take 1 tab at bedtime as needed for insomnia. (Patient not taking: Reported on 2/27/2017) 3 Tab 0   • pravastatin (PRAVACHOL) 20 MG TABS Take 1.5 Tabs by mouth every evening. (Patient not taking: Reported on 1/23/2017) 135 Each 3     No current facility-administered medications for this visit.     \"CURRENT RX\"      Allergies: Patient has no known allergies.      ROS  Unchanged from prior and notable for the sleep, respiratory, cardiac and hematologic issues reviewed above.      Physical Exam:   /72   Pulse 82   Temp 36.7 °C (98 °F)   Resp 16   Ht 1.778 m (5' 10\")   Wt 98.9 kg (218 lb)   SpO2 94%   BMI 31.28 kg/m²    Head and neck examination demonstrates no mucosal lesion, purulent drainage or evident polyps. The pharynx is benign with a Mallampati I presentation. The neck is supple without thyromegaly. On chest examination there are symmetrical bilateral breath sounds without rales, wheezing or consolidation. On cardiac examination, the apical impulse and heart sounds are normal and the rhythm is regular. There is no murmur, gallop or rub and no jugular venous distention. The abdomen is soft with active bowel sounds and no palpable hepatosplenomegaly, mass, guarding or rebound. The extremities show no clubbing, cyanosis or edema and no signs of deep venous thrombosis. There is no warmth, redness, tenderness or palpable venous cord in the calves. The skin is clear, warm and dry. There is no unusual peripheral lymphadenopathy. Peripheral pulses are palpable in all 4 extremities. On neurologic examination, cranial nerve function is intact, motor tone is symmetrical, and the patient is alert, oriented and responsive.       Problems:  1. Obstructive sleep apnea syndrome  He has mild obstructive sleep apnea hypopnea syndrome, confirmed by polysomnography and associated with significant hypoxemia. He is doing well on auto titrating CPAP, using the machine regularly as confirmed by the data " recording chip. He has reasonable levels of daytime alertness. As described above, I think that the central apnea episodes seen in the recent titration polysomnogram represent an overtreatment effect. His clinical response and the CPAP data recording chip suggest that the current CPAP pressures of 5-8 cm water are sufficient to correct his sleep-disordered breathing.    2. Hypersomnolence  Improved with effective treatment of sleep-disordered breathing.    3. Abnormal chest x-ray  The right suprahilar density was first noticed after an episode of clinical pneumonia and improved on follow-up imaging. That abnormality and the bilateral subcentimeter lower lobe subpleural nodules have remained stable now for nearly 3 years and neoplasm is very unlikely.      Plan:   1.  Continue auto titrating CPAP at 5-14 cm water pressure.  A prescription is written for new mask and supplies as needed.    2.  Return visit here in about 6 months, or sooner if new problems develop.  He may drop into the technician clinic at anytime for assistance.    We appreciate the opportunity to assist in his care.

## 2017-11-29 ENCOUNTER — TELEPHONE (OUTPATIENT)
Dept: SLEEP MEDICINE | Facility: MEDICAL CENTER | Age: 72
End: 2017-11-29

## 2017-11-29 NOTE — TELEPHONE ENCOUNTER
Patient requested a switchout to DME:  [employer] / ph [phone] / fx [phone].  He states they need copies of the sleep studies and recent office note from Dr. Marr.    He is dissatisfied with his current supplier and their lack of response.

## 2018-03-12 ENCOUNTER — HOSPITAL ENCOUNTER (OUTPATIENT)
Facility: MEDICAL CENTER | Age: 73
End: 2018-03-12
Attending: INTERNAL MEDICINE
Payer: MEDICARE

## 2018-03-12 PROCEDURE — 87040 BLOOD CULTURE FOR BACTERIA: CPT

## 2018-03-17 LAB
BACTERIA BLD CULT: NORMAL
SIGNIFICANT IND 70042: NORMAL
SITE SITE: NORMAL
SOURCE SOURCE: NORMAL

## 2018-04-18 ENCOUNTER — SLEEP CENTER VISIT (OUTPATIENT)
Dept: SLEEP MEDICINE | Facility: MEDICAL CENTER | Age: 73
End: 2018-04-18
Payer: MEDICARE

## 2018-04-18 VITALS
DIASTOLIC BLOOD PRESSURE: 72 MMHG | OXYGEN SATURATION: 94 % | RESPIRATION RATE: 14 BRPM | HEART RATE: 110 BPM | BODY MASS INDEX: 29.63 KG/M2 | WEIGHT: 207 LBS | HEIGHT: 70 IN | SYSTOLIC BLOOD PRESSURE: 140 MMHG

## 2018-04-18 DIAGNOSIS — R93.89 ABNORMAL CHEST X-RAY: ICD-10-CM

## 2018-04-18 DIAGNOSIS — G47.33 OBSTRUCTIVE SLEEP APNEA SYNDROME: ICD-10-CM

## 2018-04-18 PROCEDURE — 99214 OFFICE O/P EST MOD 30 MIN: CPT | Performed by: INTERNAL MEDICINE

## 2018-04-18 NOTE — PROGRESS NOTES
CC:  Sleep apnea hypopnea syndrome, abnormal chest CT scan.     HPI: Mr. Pham has a history of obstructive sleep apnea hypopnea syndrome. Diagnostic polysomnography on May 21, 2014 demonstrated an apnea hypopnea index of 6.3 events per hour but a minimum arterial oxygen saturation of 72% on room air. He had been treated with auto titrating CPAP using a pressure range of 5-20 cm water but felt that the peak pressure was too high. At the last office visit he was empirically adjusted down to a pressure range of 5-16 cm water and titration polysomnography was arranged. That study on February 19, 2007 demonstrated relatively few events at CPAP pressures of 5-8 cm water including supine and REM sleep time. As the CPAP pressure was increased some central apnea episodes appeared prompting the institution of BiPAP therapy, increased to a final pressure of 19/14 cm water. In reviewing the study data, I think that central apneas primarily represented an overtreatment effect.     He has continued to use the auto titrating CPAP each night, throughout the night. He is not having usual problems with fit, leakage or airway dryness. He enjoys reasonable levels of daytime alertness and is not falling asleep inappropriately. The CPAP data recording chip information is reviewed with the patient. It demonstrates use on 29 of the last 30 days with an average daily usage of 6 hours and 18 minutes. The mean pressure is 5.6 cm water with an average peak pressure of 9.3. There is a significant leak but the estimated residual apnea hypopnea index is only 5.7 events per hour.      He is also followed for an abnormal chest CT scan. He was initially evaluated after a clinical episode of pneumonia early in 2014. A CT scan of the chest on March 18, 2014 demonstrated an 18 mm spiculated density in the right suprahilar area. A follow-up CT scan on August 27, 2014 demonstrated a reduction in the scope and density of the right suprahilar  abnormality with a maximum diameter of 9 cm. Resolving pneumonia was suspected. A follow-up CT scan on February 18, 2015 suggested a possible slight increase in nodule diameter to 10 mm but follow-up scans on February 22, 2016 and July 6, 2016 demonstrated stability in the nodule. A CT scan dated February 23, 2017 again demonstrates a 10 mm spiculated right upper lobe nodule unchanged compared with the prior study. In addition there was a stable subpleural 3 mm left lower lobe nodule and a 4 mm right lower lobe subpleural nodule, both unchanged since June 16, 2008. There is still minimal right middle lobe scarring or possible bronchiectasis and no significant adenopathy or effusion. On cuts through the upper abdomen, the splenic diameter remained a bit enlarged at 15 cm. The most recent CT scan of the chest on September 28, 2017 demonstrates an unchanged 10 mm spiculated pulmonary nodule in the medial aspect of the right upper lobe and the previously identified 6 mm right lower lobe subpleural nodule and 3 mm noncalcified subpleural left lower lobe nodule, both unchanged.  There are no new nodules or masses.  The minimal change in the medial aspect of the right middle lobe has completely resolved and there is no unusual adenopathy.    He was hospitalized at [place] about a month ago for community-acquired pneumonia with fever, productive cough and radiographic infiltrates. He responded well to antibiotics and seems to have recovered clinically with no persisting sputum production or constitutional symptoms. He saw Dr. Lebron at [place] who did a follow-up chest x-ray in the office about 4 weeks after the hospitalization.  [facility] supplies a copy of the interpretation and that follow-up film demonstrates improved left lower lobe infiltrate. A CT scan of the chest as scheduled.     He is followed by Dr. Ochoa for pancytopenia and possible myelodysplastic syndrome. He's had no bleeding or recent symptoms of  infection. And undergo a recent bone marrow biopsy with results pending.      Patient Active Problem List    Diagnosis Date Noted   • CAD (coronary artery disease) 08/31/2011   • Dyslipidemia 08/31/2011       Past Medical History:   Diagnosis Date   • Benign prostatic hypertrophy    • CAD (coronary artery disease) 8/31/2011   • Chickenpox    • COPD (chronic obstructive pulmonary disease) (CMS-[facility])    • Emphysema lung (CMS-HCC)    • Amharic measles    • Influenza    • Mumps    • Pulmonary nodule    • Thrombocytopenia (CMS-HCC)    • Tonsillitis        Past Surgical History:   Procedure Laterality Date   • EGD WITH ASP/BX  3/13/2012    Performed by RANI NÚÑEZ at SURGERY [facility] ORS   • GASTROSCOPY WITH BIOPSY  3/13/2012    Performed by RANI NÚÑEZ at [facility] ORS   • ERCP W/SPHINCTEROTOMY/PAPILL.  3/13/2012    Performed by RANI NÚÑEZ at [facility] ORS   • UMBILICAL HERNIA REPAIR  4/17/08    Performed by MIGUEL A BETANCOURT at SURGERY SAME DAY [facility] ORS   • APPENDECTOMY     • CHOLECYSTECTOMY     • HERNIA REPAIR      BILATERAL   • TONSILLECTOMY     • UROLOGY SURGERY         Family History   Problem Relation Age of Onset   • Lung Disease Mother    • Lung Cancer Mother    • Lung Disease Father    • Respiratory Disease Father        Social History     Social History   • Marital status:      Spouse name: N/A   • Number of children: N/A   • Years of education: N/A     Occupational History   • Not on file.     Social History Main Topics   • Smoking status: Former Smoker     Packs/day: 0.50     Years: 40.00     Quit date: 8/31/2001   • Smokeless tobacco: Never Used   • Alcohol use 0.6 oz/week     1 Glasses of wine per week      Comment: 1 glass of wine each week, occasionally   • Drug use: No   • Sexual activity: Not on file     Other Topics Concern   • Not on file     Social History Narrative   • No narrative on file       Current Outpatient Prescriptions   Medication Sig  "Dispense Refill   • Red Yeast Rice 600 MG Cap Take  by mouth.     • B Complex Vitamins (B-COMPLEX/B-12 SL) Place  under tongue.     • Inulin (FIBER CHOICE PO) Take  by mouth.     • Niacin (VITAMIN B-3 PO) Take  by mouth.     • Magnesium 100 MG Cap Take  by mouth.     • Flaxseed, Linseed, (FLAX SEED OIL) 1000 MG Cap Take  by mouth.     • ursodiol (KARYN 250) 250 MG Tab Take 250 mg by mouth 4 times a day. RX     • MULTIPLE VITAMIN PO Take  by mouth.     • zolpidem (AMBIEN) 5 MG Tab Take 1 Tab by mouth at bedtime as needed for Sleep (insomnia). Take 1 tab at bedtime as needed for insomnia. (Patient not taking: Reported on 2/27/2017) 3 Tab 0   • omeprazole (PRILOSEC) 20 MG CPDR Take 40 mg by mouth 2 times a day. RX     • pravastatin (PRAVACHOL) 20 MG TABS Take 1.5 Tabs by mouth every evening. (Patient not taking: Reported on 1/23/2017) 135 Each 3     No current facility-administered medications for this visit.     \"CURRENT RX\"      Allergies: Patient has no known allergies.      ROS  Unchanged from prior and notable for the sleep, respiratory, cardiac and hematologic issues reviewed above.      Physical Exam:   /72   Pulse (!) 110   Resp 14   Ht 1.778 m (5' 10\")   Wt 93.9 kg (207 lb)   SpO2 94%   BMI 29.70 kg/m²    Head and neck examination demonstrates no mucosal lesion, purulent drainage or evident polyps. The pharynx is benign with a Mallampati I presentation and previous uvulopalatopharyngoplasty. The neck is supple without thyromegaly. On chest examination there are symmetrical bilateral breath sounds without rales, wheezing or consolidation. On cardiac examination, the apical impulse and heart sounds are normal and the rhythm is regular. There is no murmur, gallop or rub and no jugular venous distention. The abdomen is soft with active bowel sounds and no palpable hepatosplenomegaly, mass, guarding or rebound. The extremities show no clubbing, cyanosis or edema and no signs of deep venous thrombosis. " There is no warmth, redness, tenderness or palpable venous cord in the calves. The skin is clear, warm and dry. There is no unusual peripheral lymphadenopathy. Peripheral pulses are palpable in all 4 extremities. On neurologic examination, cranial nerve function is intact, motor tone is symmetrical, and the patient is alert, oriented and responsive.       Problems:  1. Obstructive sleep apnea syndrome  He has mild obstructive sleep apnea hypopnea syndrome, confirmed by polysomnography and associated with significant hypoxemia. He is doing well on auto titrating CPAP, using the machine regularly as confirmed by the data recording chip. He has reasonable levels of daytime alertness. As described above, I think that the central apnea episodes seen in the recent titration polysomnogram represent an overtreatment effect. His clinical response and the CPAP data recording chip suggest that the current CPAP pressures of 5-8 cm water are sufficient to correct his sleep-disordered breathing. He does describe some mask leak and the chip does suggest a significant leak as well.    2. Abnormal chest x-ray  The right suprahilar density was first noticed after an episode of clinical pneumonia and improved on follow-up imaging. That abnormality and the bilateral subcentimeter lower lobe subpleural nodules remained stable  for nearly 3 years and neoplasm is very unlikely. He did have a recent episode of community-acquired pneumonia which is resolved clinically but not yet completely resolved with some residual left lower lobe density. I would agree with the pending CT scan of the chest.      Plan:   1. Mask fitting today. He did well with a medium Verito mask.    2. Continue auto titrating CPAP at 5-14 cm water pressure.    3. We will look for the results of the pending CT scan.    4. Return visit here in about 6 months, or sooner if new problems develop.  He may drop into the technician clinic at anytime for assistance.     We  appreciate the opportunity to assist in his care.  Return in about 6 months (around 10/18/2018).

## 2018-04-26 ENCOUNTER — TELEPHONE (OUTPATIENT)
Dept: SLEEP MEDICINE | Facility: MEDICAL CENTER | Age: 73
End: 2018-04-26

## 2018-04-26 NOTE — TELEPHONE ENCOUNTER
Pt's brought in results from CT Scan of Chest. Wants to know if he really has to get another CT or PET/CT

## 2018-07-02 ENCOUNTER — TELEPHONE (OUTPATIENT)
Dept: SLEEP MEDICINE | Facility: MEDICAL CENTER | Age: 73
End: 2018-07-02

## 2018-07-02 NOTE — TELEPHONE ENCOUNTER
I spoke with Nathan Pham by phone today. He underwent a follow-up CT scan at [facility] dated June 27, 2018. I reviewed that report and it demonstrates resolution of the previously identified left lower lobe infiltrate that was consistent with pneumonia. Otherwise the film is unchanged compared with prior studies. There is a stable 9-10 mm spiculated nodule in the right upper lobe. I reviewed all of the CAT scans over the last 5 years. The original scan during a period of clinical pneumonia on March 18, 2014 demonstrated an 18 mm density in that area of the right upper lobe. A follow-up scan on August 27, 2014 demonstrated a reduction in the size of the abnormality to 9 mm. Follow-up scans on July 6, 2016, February 23,2017, September 28, 2017 and the current study on June 27, 2018 all show stability in that right upper lobe nodule.   The right upper lobe nodule which has been stable for almost 4 years now clearly represents a benign old inflammatory scar. I don't think that follow-up imaging is required unless new symptoms develop.

## 2018-09-05 ENCOUNTER — APPOINTMENT (RX ONLY)
Dept: URBAN - METROPOLITAN AREA CLINIC 4 | Facility: CLINIC | Age: 73
Setting detail: DERMATOLOGY
End: 2018-09-05

## 2018-09-05 DIAGNOSIS — B00.1 HERPESVIRAL VESICULAR DERMATITIS: ICD-10-CM

## 2018-09-05 DIAGNOSIS — L82.1 OTHER SEBORRHEIC KERATOSIS: ICD-10-CM

## 2018-09-05 PROCEDURE — 99213 OFFICE O/P EST LOW 20 MIN: CPT

## 2018-09-05 PROCEDURE — ? PRESCRIPTION

## 2018-09-05 PROCEDURE — ? COUNSELING

## 2018-09-05 RX ORDER — VALACYCLOVIR HYDROCHLORIDE 1 G/1
TABLET, FILM COATED ORAL Q12 HOURS
Qty: 16 | Refills: 1 | Status: ERX | COMMUNITY
Start: 2018-09-05

## 2018-09-05 RX ADMIN — VALACYCLOVIR HYDROCHLORIDE 1: 1 TABLET, FILM COATED ORAL at 00:00

## 2018-09-05 ASSESSMENT — LOCATION ZONE DERM
LOCATION ZONE: ARM
LOCATION ZONE: FACE

## 2018-09-05 ASSESSMENT — LOCATION SIMPLE DESCRIPTION DERM
LOCATION SIMPLE: CHIN
LOCATION SIMPLE: RIGHT FOREARM

## 2018-09-05 ASSESSMENT — LOCATION DETAILED DESCRIPTION DERM
LOCATION DETAILED: RIGHT CHIN
LOCATION DETAILED: RIGHT DISTAL DORSAL FOREARM

## 2018-09-05 NOTE — HPI: SKIN LESION
Is This A New Presentation, Or A Follow-Up?: Skin Lesion
How Severe Is Your Skin Lesion?: moderate
Has Your Skin Lesion Been Treated?: not been treated
Additional History: Lesion on right forearm for two weeks.

## 2018-10-16 ENCOUNTER — SLEEP CENTER VISIT (OUTPATIENT)
Dept: SLEEP MEDICINE | Facility: MEDICAL CENTER | Age: 73
End: 2018-10-16
Payer: MEDICARE

## 2018-10-16 VITALS
HEIGHT: 70 IN | RESPIRATION RATE: 16 BRPM | BODY MASS INDEX: 30.78 KG/M2 | DIASTOLIC BLOOD PRESSURE: 64 MMHG | OXYGEN SATURATION: 96 % | WEIGHT: 215 LBS | HEART RATE: 86 BPM | SYSTOLIC BLOOD PRESSURE: 120 MMHG

## 2018-10-16 DIAGNOSIS — R93.89 ABNORMAL CHEST X-RAY: ICD-10-CM

## 2018-10-16 DIAGNOSIS — Z87.891 FORMER SMOKER: ICD-10-CM

## 2018-10-16 DIAGNOSIS — E66.9 OBESITY (BMI 30-39.9): ICD-10-CM

## 2018-10-16 DIAGNOSIS — G47.33 OBSTRUCTIVE SLEEP APNEA SYNDROME: ICD-10-CM

## 2018-10-16 PROCEDURE — 99214 OFFICE O/P EST MOD 30 MIN: CPT | Performed by: INTERNAL MEDICINE

## 2018-10-27 NOTE — PROGRESS NOTES
CC:  Sleep apnea hypopnea syndrome, abnormal chest CT scan.     HPI: Mr. Pham has a history of obstructive sleep apnea hypopnea syndrome. Diagnostic polysomnography on May 21, 2014 demonstrated an apnea hypopnea index of 6.3 events per hour but a minimum arterial oxygen saturation of 72% on room air. He had been treated with auto titrating CPAP using a pressure range of 5-20 cm water but felt that the peak pressure was too high. He was empirically adjusted down to a pressure range of 5-16 cm water and titration polysomnography was arranged. That study on February 19, 2007 demonstrated relatively few events at CPAP pressures of 5-8 cm water including supine and REM sleep time. As the CPAP pressure was increased some central apnea episodes appeared prompting the institution of BiPAP therapy, increased to a final pressure of 19/14 cm water. In reviewing the study data, I think that central apneas primarily represented an overtreatment effect.     He has continued to use the auto titrating CPAP each night, throughout the night. He is not having usual problems with fit, leakage or airway dryness. He enjoys reasonable levels of daytime alertness and is not falling asleep inappropriately. The CPAP data recording chip information is reviewed with the patient. It demonstrates use on each of the last 30 days with an average daily usage of 8 hours and 6 minutes. The mean pressure is 5.9 cm water with an average peak pressure of 7.5. There is a significant leak but the estimated residual apnea hypopnea index is only 3.7 events per hour.      He is also followed for an abnormal chest CT scan. He was initially evaluated after a clinical episode of pneumonia early in 2014. A CT scan of the chest on March 18, 2014 demonstrated an 18 mm spiculated density in the right suprahilar area. A follow-up CT scan on August 27, 2014 demonstrated a reduction in the scope and density of the right suprahilar abnormality with a maximum  diameter of 9 cm. Resolving pneumonia was suspected. A follow-up CT scan on February 18, 2015 suggested a possible slight increase in nodule diameter to 10 mm but follow-up scans on February 22, 2016 and July 6, 2016 demonstrated stability in the nodule. A CT scan dated February 23, 2017 again demonstrates a 10 mm spiculated right upper lobe nodule unchanged compared with the prior study. In addition there was a stable subpleural 3 mm left lower lobe nodule and a 4 mm right lower lobe subpleural nodule, both unchanged since June 16, 2008. There is still minimal right middle lobe scarring or possible bronchiectasis and no significant adenopathy or effusion. On cuts through the upper abdomen, the splenic diameter remained a bit enlarged at 15 cm. A CT scan of the chest on September 28, 2017 demonstrated an unchanged 10 mm spiculated pulmonary nodule in the medial aspect of the right upper lobe and the previously identified 6 mm right lower lobe subpleural nodule and 3 mm noncalcified subpleural left lower lobe nodule, both unchanged.  There are no new nodules or masses.  The minimal change in the medial aspect of the right middle lobe has completely resolved and there is no unusual adenopathy.     He was hospitalized at [facility] last spring ago for community-acquired pneumonia with fever, productive cough and radiographic infiltrates. He responded well to antibiotics and seems to have recovered clinically with no persisting sputum production or constitutional symptoms. A CT scan of the chest on at [facility] on April 26, 2018 demonstrated left lower lobe consolidation, consistent with the community acquired pneumonia at that time and stability in the 9 mm right upper lobe an 8 mm right lower lobe nodules along with some stable bronchiectasis.He saw Dr. Lebron at [facility] who did a follow-up chest x-ray in the office about 4 weeks after the hospitalization and of that evaluation apparently demonstrated resolution of  the left lower lobe pneumonia..     He is followed by Dr. Ochoa for pancytopenia and possible myelodysplastic syndrome. He's had no bleeding or recent symptoms of infection. He did undergo a recent bone marrow biopsy.      Patient Active Problem List    Diagnosis Date Noted   • Obesity (BMI 30-39.9) 10/16/2018   • CAD (coronary artery disease) 08/31/2011   • Dyslipidemia 08/31/2011       Past Medical History:   Diagnosis Date   • Benign prostatic hypertrophy    • CAD (coronary artery disease) 8/31/2011   • Chickenpox    • COPD (chronic obstructive pulmonary disease) (HCC)    • Emphysema lung (HCC)    • Maori measles    • Influenza    • Mumps    • Pulmonary nodule    • Thrombocytopenia (HCC)    • Tonsillitis        Past Surgical History:   Procedure Laterality Date   • EGD WITH ASP/BX  3/13/2012    Performed by RANI NÚÑEZ at SURGERY [facility] ORS   • GASTROSCOPY WITH BIOPSY  3/13/2012    Performed by RANI NÚÑEZ at [facility] ORS   • ERCP W/SPHINCTEROTOMY/PAPILL.  3/13/2012    Performed by RANI NÚÑEZ at [facility] ORS   • UMBILICAL HERNIA REPAIR  4/17/08    Performed by MIGUEL A BETANCOURT at SURGERY SAME DAY [facility] ORS   • APPENDECTOMY     • CHOLECYSTECTOMY     • HERNIA REPAIR      BILATERAL   • TONSILLECTOMY     • UROLOGY SURGERY         Family History   Problem Relation Age of Onset   • Lung Disease Mother    • Lung Cancer Mother    • Lung Disease Father    • Respiratory Disease Father        Social History     Social History   • Marital status:      Spouse name: N/A   • Number of children: N/A   • Years of education: N/A     Occupational History   • Not on file.     Social History Main Topics   • Smoking status: Former Smoker     Packs/day: 0.50     Years: 40.00     Quit date: 8/31/2001   • Smokeless tobacco: Never Used   • Alcohol use 0.6 oz/week     1 Glasses of wine per week      Comment: 1 glass of wine each week, occasionally   • Drug use: No   • Sexual  "activity: Not on file     Other Topics Concern   • Not on file     Social History Narrative   • No narrative on file       Current Outpatient Prescriptions   Medication Sig Dispense Refill   • Red Yeast Rice 600 MG Cap Take  by mouth.     • B Complex Vitamins (B-COMPLEX/B-12 SL) Place  under tongue.     • Inulin (FIBER CHOICE PO) Take  by mouth.     • Niacin (VITAMIN B-3 PO) Take  by mouth.     • Magnesium 100 MG Cap Take  by mouth.     • Flaxseed, Linseed, (FLAX SEED OIL) 1000 MG Cap Take  by mouth.     • ursodiol (KARYN 250) 250 MG Tab Take 250 mg by mouth 4 times a day. RX     • MULTIPLE VITAMIN PO Take  by mouth.     • zolpidem (AMBIEN) 5 MG Tab Take 1 Tab by mouth at bedtime as needed for Sleep (insomnia). Take 1 tab at bedtime as needed for insomnia. (Patient not taking: Reported on 2/27/2017) 3 Tab 0   • omeprazole (PRILOSEC) 20 MG CPDR Take 40 mg by mouth 2 times a day. RX     • pravastatin (PRAVACHOL) 20 MG TABS Take 1.5 Tabs by mouth every evening. (Patient not taking: Reported on 1/23/2017) 135 Each 3     No current facility-administered medications for this visit.     \"CURRENT RX\"      Allergies: Patient has no known allergies.      ROS  Unchanged from prior and notable for the sleep, respiratory, cardiac and hematologic issues reviewed above.      Physical Exam:   /64 (BP Location: Right arm, Patient Position: Sitting, BP Cuff Size: Adult)   Pulse 86   Resp 16   Ht 1.778 m (5' 10\")   Wt 97.5 kg (215 lb)   SpO2 96%   BMI 30.85 kg/m²    Head and neck examination demonstrates no mucosal lesion, purulent drainage or evident polyps. The pharynx is benign with a Mallampati II presentation. The neck is supple without thyromegaly. On chest examination there are symmetrical bilateral breath sounds without rales, wheezing or consolidation. On cardiac examination, the apical impulse and heart sounds are normal and the rhythm is regular. There is no murmur, gallop or rub and no jugular venous distention. " The abdomen is soft with active bowel sounds and no palpable hepatosplenomegaly, mass, guarding or rebound. The extremities show no clubbing, cyanosis or edema and no signs of deep venous thrombosis. There is no warmth, redness, tenderness or palpable venous cord in the calves. The skin is clear, warm and dry. There is no unusual peripheral lymphadenopathy. Peripheral pulses are palpable in all 4 extremities. On neurologic examination, cranial nerve function is intact, motor tone is symmetrical, and the patient is alert, oriented and responsive.       Problems:  1. Obstructive sleep apnea syndrome  He has mild obstructive sleep apnea hypopnea syndrome, confirmed by polysomnography and associated with significant hypoxemia. He is doing well on auto titrating CPAP, using the machine regularly as confirmed by the data recording chip. He has reasonable levels of daytime alertness. As described above, I think that the central apnea episodes seen in the recent titration polysomnogram represent an overtreatment effect. His clinical response and the CPAP data recording chip suggest that the current CPAP pressures of 5-8 cm water are sufficient to correct his sleep-disordered breathing. He does describe some mask leak and the chip does suggest a significant leak as well.    2. Abnormal chest x-ray  The right suprahilar density was first noticed after an episode of clinical pneumonia and improved on follow-up imaging. That abnormality and the bilateral subcentimeter lower lobe subpleural nodules remained stable  for nearly 3 years and neoplasm is very unlikely. He did have a recent episode of community-acquired pneumonia which is resolved clinically and radiographically.     3. BMI = 30   Patient identified as having weight management issue.  Appropriate orders and counseling given.    4. Former smoker  He is encouraged to remain completely and permanently off cigarettes.      Plan:   1.  Continue auto titrating CPAP at 5-14  cm water pressure with a medium Verito mask.  A prescription is written for new mask and supplies as needed.    2.  Return visit here in 6 months, or sooner if new problems develop.  He may drop into the technician clinic at anytime for assistance.    We appreciate the opportunity to assist in his care.

## 2019-03-18 ENCOUNTER — SLEEP CENTER VISIT (OUTPATIENT)
Dept: SLEEP MEDICINE | Facility: MEDICAL CENTER | Age: 74
End: 2019-03-18
Payer: MEDICARE

## 2019-03-18 VITALS
DIASTOLIC BLOOD PRESSURE: 70 MMHG | OXYGEN SATURATION: 96 % | RESPIRATION RATE: 16 BRPM | WEIGHT: 212 LBS | SYSTOLIC BLOOD PRESSURE: 122 MMHG | HEIGHT: 70 IN | BODY MASS INDEX: 30.35 KG/M2 | HEART RATE: 72 BPM

## 2019-03-18 DIAGNOSIS — G47.33 OBSTRUCTIVE SLEEP APNEA SYNDROME: ICD-10-CM

## 2019-03-18 DIAGNOSIS — R93.89 ABNORMAL CHEST X-RAY: ICD-10-CM

## 2019-03-18 DIAGNOSIS — Z87.891 FORMER SMOKER: ICD-10-CM

## 2019-03-18 PROCEDURE — 99214 OFFICE O/P EST MOD 30 MIN: CPT | Performed by: INTERNAL MEDICINE

## 2019-03-27 NOTE — PROGRESS NOTES
CC:  Sleep apnea hypopnea syndrome, abnormal chest CT scan.     HPI: Mr. Pham has a history of obstructive sleep apnea hypopnea syndrome. Diagnostic polysomnography on May 21, 2014 demonstrated an apnea hypopnea index of 6.3 events per hour but a minimum arterial oxygen saturation of 72% on room air. He had been treated with auto titrating CPAP using a pressure range of 5-20 cm water but felt that the peak pressure was too high. He was empirically adjusted down to a pressure range of 5-16 cm water and titration polysomnography was arranged. That study on February 19, 2017 demonstrated relatively few events at CPAP pressures of 5-8 cm water including supine and REM sleep time. As the CPAP pressure was increased some central apnea episodes appeared prompting the institution of BiPAP therapy, increased to a final pressure of 19/14 cm water. In reviewing the study data, I think that central apneas primarily represented an overtreatment effect.     He has continued to use the auto titrating CPAP each night, throughout the night. He is not having usual problems with fit, leakage or airway dryness. He enjoys reasonable levels of daytime alertness and is not falling asleep inappropriately.     The CPAP data recording chip information is reviewed with the patient. It demonstrates use on each of the last 30 days with an average daily usage of 7 hours and 2 minutes. The mean pressure is 5.8 cm water with an average peak pressure of 9.5. There is a significant leak but the estimated residual apnea hypopnea index is only 4.2 events per hour.      He is also followed for an abnormal chest CT scan. He was initially evaluated after a clinical episode of pneumonia early in 2014. A CT scan of the chest on March 18, 2014 demonstrated an 18 mm spiculated density in the right suprahilar area. A follow-up CT scan on August 27, 2014 demonstrated a reduction in the scope and density of the right suprahilar abnormality with a maximum  diameter of 9 cm. Resolving pneumonia was suspected. A follow-up CT scan on February 18, 2015 suggested a possible slight increase in nodule diameter to 10 mm but follow-up scans on February 22, 2016 and July 6, 2016 demonstrated stability in the nodule. A CT scan dated February 23, 2017 again demonstrates a 10 mm spiculated right upper lobe nodule unchanged compared with the prior study. In addition there was a stable subpleural 3 mm left lower lobe nodule and a 4 mm right lower lobe subpleural nodule, both unchanged since June 16, 2008. There is still minimal right middle lobe scarring or possible bronchiectasis and no significant adenopathy or effusion. On cuts through the upper abdomen, the splenic diameter remained a bit enlarged at 15 cm. A CT scan of the chest on September 28, 2017 demonstrated an unchanged 10 mm spiculated pulmonary nodule in the medial aspect of the right upper lobe and the previously identified 6 mm right lower lobe subpleural nodule and 3 mm noncalcified subpleural left lower lobe nodule, both unchanged.  There are no new nodules or masses.  The minimal change in the medial aspect of the right middle lobe has completely resolved and there is no unusual adenopathy.     He was hospitalized at [place] last spring ago for community-acquired pneumonia with fever, productive cough and radiographic infiltrates. He responded well to antibiotics and seems to have recovered clinically with no persisting sputum production or constitutional symptoms. A CT scan of the chest on at [place] on April 26, 2018 demonstrated left lower lobe consolidation, consistent with the community acquired pneumonia at that time and stability in the 9 mm right upper lobe an 8 mm right lower lobe nodules along with some stable bronchiectasis.He saw Dr. Lebron at [place] who did a follow-up chest x-ray in the office about 4 weeks after the hospitalization and of that evaluation apparently demonstrated resolution of  the left lower lobe pneumonia..      He is followed by Dr. Ochoa for pancytopenia and possible myelodysplastic syndrome. He's had no bleeding or recent symptoms of infection. He did undergo a recent bone marrow biopsy.        Patient Active Problem List    Diagnosis Date Noted   • Obesity (BMI 30-39.9) 10/16/2018   • CAD (coronary artery disease) 08/31/2011   • Dyslipidemia 08/31/2011       Past Medical History:   Diagnosis Date   • Benign prostatic hypertrophy    • CAD (coronary artery disease) 8/31/2011   • Chickenpox    • COPD (chronic obstructive pulmonary disease) (HCC)    • Emphysema lung (HCC)    • [place] measles    • Influenza    • Mumps    • Pulmonary nodule    • Thrombocytopenia (HCC)    • Tonsillitis        Past Surgical History:   Procedure Laterality Date   • EGD WITH ASP/BX  3/13/2012    Performed by RANI NÚÑEZ at SURGERY [facility] ORS   • GASTROSCOPY WITH BIOPSY  3/13/2012    Performed by RANI NÚÑEZ at [facility] ORS   • ERCP W/SPHINCTEROTOMY/PAPILL.  3/13/2012    Performed by RANI NÚÑEZ at [facility] ORS   • UMBILICAL HERNIA REPAIR  4/17/08    Performed by MIGUEL A BETANCOURT at SURGERY SAME DAY [facility] ORS   • APPENDECTOMY     • CHOLECYSTECTOMY     • HERNIA REPAIR      BILATERAL   • TONSILLECTOMY     • UROLOGY SURGERY         Family History   Problem Relation Age of Onset   • Lung Disease Mother    • Lung Cancer Mother    • Lung Disease Father    • Respiratory Disease Father        Social History     Social History   • Marital status:      Spouse name: N/A   • Number of children: N/A   • Years of education: N/A     Occupational History   • Not on file.     Social History Main Topics   • Smoking status: Former Smoker     Packs/day: 0.50     Years: 40.00     Quit date: 8/31/2001   • Smokeless tobacco: Never Used   • Alcohol use 0.6 oz/week     1 Glasses of wine per week      Comment: 1 glass of wine each week, occasionally   • Drug use: No   • Sexual  "activity: Not on file     Other Topics Concern   • Not on file     Social History Narrative   • No narrative on file       Current Outpatient Prescriptions   Medication Sig Dispense Refill   • Red Yeast Rice 600 MG Cap Take  by mouth.     • B Complex Vitamins (B-COMPLEX/B-12 SL) Place  under tongue.     • Inulin (FIBER CHOICE PO) Take  by mouth.     • Niacin (VITAMIN B-3 PO) Take  by mouth.     • Magnesium 100 MG Cap Take  by mouth.     • Flaxseed, Linseed, (FLAX SEED OIL) 1000 MG Cap Take  by mouth.     • ursodiol (KARYN 250) 250 MG Tab Take 250 mg by mouth 4 times a day. RX     • MULTIPLE VITAMIN PO Take  by mouth.     • zolpidem (AMBIEN) 5 MG Tab Take 1 Tab by mouth at bedtime as needed for Sleep (insomnia). Take 1 tab at bedtime as needed for insomnia. (Patient not taking: Reported on 2/27/2017) 3 Tab 0   • omeprazole (PRILOSEC) 20 MG CPDR Take 40 mg by mouth 2 times a day. RX     • pravastatin (PRAVACHOL) 20 MG TABS Take 1.5 Tabs by mouth every evening. (Patient not taking: Reported on 1/23/2017) 135 Each 3     No current facility-administered medications for this visit.     \"CURRENT RX\"      Allergies: Patient has no known allergies.      ROS  Unchanged from prior and notable for the sleep, respiratory, cardiac and hematologic issues reviewed above. No new problems.      Physical Exam:   /70 (BP Location: Right arm, Patient Position: Sitting, BP Cuff Size: Adult)   Pulse 72   Resp 16   Ht 1.778 m (5' 10\")   Wt 96.2 kg (212 lb)   SpO2 96%   BMI 30.42 kg/m²    Head and neck examination demonstrates no mucosal lesion, purulent drainage or evident polyps. The pharynx is benign with a Mallampati II presentation. The neck is supple without thyromegaly. On chest examination there are symmetrical bilateral breath sounds without rales, wheezing or consolidation. On cardiac examination, the apical impulse and heart sounds are normal and the rhythm is regular. There is no murmur, gallop or rub and no jugular " venous distention. The abdomen is soft with active bowel sounds and no palpable hepatosplenomegaly, mass, guarding or rebound. The extremities show no clubbing, cyanosis or edema and no signs of deep venous thrombosis. There is no warmth, redness, tenderness or palpable venous cord in the calves. The skin is clear, warm and dry. There is no unusual peripheral lymphadenopathy. Peripheral pulses are palpable in all 4 extremities. On neurologic examination, cranial nerve function is intact, motor tone is symmetrical, and the patient is alert, oriented and responsive.       Problems:  1. Obstructive sleep apnea syndrome  He has at least mild obstructive sleep apnea hypopnea syndrome, confirmed by polysomnography and associated with significant hypoxemia. He is doing well on auto titrating CPAP, using the machine regularly as confirmed by the data recording chip. He has reasonable levels of daytime alertness. As described above, I think that the central apnea episodes seen in the previous titration polysomnogram represent an overtreatment effect. His clinical response and the CPAP data recording chip suggest that the current CPAP pressures of 5-8 cm water are sufficient to correct his sleep-disordered breathing. He does describe some mask leak and the chip does suggest a significant leak as well. His equipment is failing with a noisy CPAP generator, leakage mask seals and stretched headgear. New equipment is needed    2. Abnormal chest x-ray  The right suprahilar density was first noticed after an episode of clinical pneumonia and improved on follow-up imaging. That abnormality and the bilateral subcentimeter lower lobe subpleural nodules remained stable  for nearly 3 years and neoplasm is very unlikely. He did have a recent episode of community-acquired pneumonia which is resolved clinically and radiographically.     3. Former smoker  He is again encouraged to remain completely and permanently off cigarettes.    4. BMI  30.0-30.9,adult      Plan:   1.  A new auto titrating CPAP machine set for pressure of 5-14 cm water with a shani or other nasal mask.    2.  Return visit here in 6 months, or sooner if new problems develop.    3.  Follow-up with hematology as scheduled.    We appreciate the opportunity to assist in his care  Return in about 6 months (around 9/18/2019).

## 2019-07-23 ENCOUNTER — HOSPITAL ENCOUNTER (OUTPATIENT)
Dept: HOSPITAL 8 - RAD | Age: 74
Discharge: HOME | End: 2019-07-23
Attending: INTERNAL MEDICINE
Payer: MEDICARE

## 2019-07-23 DIAGNOSIS — R16.1: ICD-10-CM

## 2019-07-23 DIAGNOSIS — N28.1: ICD-10-CM

## 2019-07-23 DIAGNOSIS — K76.0: Primary | ICD-10-CM

## 2019-07-23 DIAGNOSIS — Z90.49: ICD-10-CM

## 2019-07-23 PROCEDURE — 76700 US EXAM ABDOM COMPLETE: CPT

## 2019-09-10 ENCOUNTER — APPOINTMENT (RX ONLY)
Dept: URBAN - METROPOLITAN AREA CLINIC 4 | Facility: CLINIC | Age: 74
Setting detail: DERMATOLOGY
End: 2019-09-10

## 2019-09-10 DIAGNOSIS — L82.1 OTHER SEBORRHEIC KERATOSIS: ICD-10-CM

## 2019-09-10 DIAGNOSIS — D22 MELANOCYTIC NEVI: ICD-10-CM

## 2019-09-10 DIAGNOSIS — D18.0 HEMANGIOMA: ICD-10-CM

## 2019-09-10 DIAGNOSIS — L81.4 OTHER MELANIN HYPERPIGMENTATION: ICD-10-CM

## 2019-09-10 DIAGNOSIS — B00.1 HERPESVIRAL VESICULAR DERMATITIS: ICD-10-CM

## 2019-09-10 PROBLEM — D22.5 MELANOCYTIC NEVI OF TRUNK: Status: ACTIVE | Noted: 2019-09-10

## 2019-09-10 PROBLEM — D18.01 HEMANGIOMA OF SKIN AND SUBCUTANEOUS TISSUE: Status: ACTIVE | Noted: 2019-09-10

## 2019-09-10 PROBLEM — D22.62 MELANOCYTIC NEVI OF LEFT UPPER LIMB, INCLUDING SHOULDER: Status: ACTIVE | Noted: 2019-09-10

## 2019-09-10 PROBLEM — D22.72 MELANOCYTIC NEVI OF LEFT LOWER LIMB, INCLUDING HIP: Status: ACTIVE | Noted: 2019-09-10

## 2019-09-10 PROBLEM — D22.71 MELANOCYTIC NEVI OF RIGHT LOWER LIMB, INCLUDING HIP: Status: ACTIVE | Noted: 2019-09-10

## 2019-09-10 PROBLEM — D22.61 MELANOCYTIC NEVI OF RIGHT UPPER LIMB, INCLUDING SHOULDER: Status: ACTIVE | Noted: 2019-09-10

## 2019-09-10 PROCEDURE — ? COUNSELING

## 2019-09-10 PROCEDURE — 99214 OFFICE O/P EST MOD 30 MIN: CPT

## 2019-09-10 PROCEDURE — ? SUNSCREEN RECOMMENDATIONS

## 2019-09-10 ASSESSMENT — LOCATION DETAILED DESCRIPTION DERM
LOCATION DETAILED: RIGHT ANTERIOR PROXIMAL THIGH
LOCATION DETAILED: LEFT INFERIOR ANTERIOR NECK
LOCATION DETAILED: LEFT CENTRAL MALAR CHEEK
LOCATION DETAILED: RIGHT DISTAL POSTERIOR UPPER ARM
LOCATION DETAILED: POSTERIOR MID-PARIETAL SCALP
LOCATION DETAILED: SUPERIOR THORACIC SPINE
LOCATION DETAILED: RIGHT CENTRAL MALAR CHEEK
LOCATION DETAILED: LEFT ANTERIOR PROXIMAL THIGH
LOCATION DETAILED: LEFT PROXIMAL POSTERIOR UPPER ARM
LOCATION DETAILED: LEFT INFERIOR VERMILION LIP
LOCATION DETAILED: LEFT SUPERIOR MEDIAL UPPER BACK
LOCATION DETAILED: RIGHT INFERIOR MEDIAL UPPER BACK
LOCATION DETAILED: RIGHT RADIAL DORSAL HAND
LOCATION DETAILED: PERIUMBILICAL SKIN
LOCATION DETAILED: RIGHT PROXIMAL DORSAL FOREARM
LOCATION DETAILED: LEFT PROXIMAL DORSAL FOREARM
LOCATION DETAILED: LEFT ULNAR DORSAL HAND

## 2019-09-10 ASSESSMENT — LOCATION SIMPLE DESCRIPTION DERM
LOCATION SIMPLE: RIGHT UPPER BACK
LOCATION SIMPLE: RIGHT HAND
LOCATION SIMPLE: ABDOMEN
LOCATION SIMPLE: RIGHT THIGH
LOCATION SIMPLE: RIGHT FOREARM
LOCATION SIMPLE: RIGHT CHEEK
LOCATION SIMPLE: LEFT THIGH
LOCATION SIMPLE: LEFT FOREARM
LOCATION SIMPLE: LEFT LIP
LOCATION SIMPLE: UPPER BACK
LOCATION SIMPLE: LEFT POSTERIOR UPPER ARM
LOCATION SIMPLE: LEFT UPPER BACK
LOCATION SIMPLE: LEFT CHEEK
LOCATION SIMPLE: RIGHT POSTERIOR UPPER ARM
LOCATION SIMPLE: POSTERIOR SCALP
LOCATION SIMPLE: LEFT ANTERIOR NECK
LOCATION SIMPLE: LEFT HAND

## 2019-09-10 ASSESSMENT — LOCATION ZONE DERM
LOCATION ZONE: HAND
LOCATION ZONE: TRUNK
LOCATION ZONE: SCALP
LOCATION ZONE: NECK
LOCATION ZONE: LEG
LOCATION ZONE: LIP
LOCATION ZONE: ARM
LOCATION ZONE: FACE

## 2019-09-10 NOTE — HPI: FULL BODY SKIN EXAMINATION
How Severe Are Your Spot(S)?: moderate
What Type Of Note Output Would You Prefer (Optional)?: Bullet Format
What Is The Reason For Today's Visit?: Full Body Skin Examination
What Is The Reason For Today's Visit? (Being Monitored For X): the development of a new lesion
Additional History: Full body exam, has a possible cold sore on his right lower lip x 2 weeks, used Abriva.

## 2019-09-10 NOTE — PROCEDURE: COUNSELING
Detail Level: Zone
Detail Level: Detailed
Patient Specific Counseling (Will Not Stick From Patient To Patient): Keep moist with Vaseline. Patient understands

## 2019-09-11 ENCOUNTER — SLEEP CENTER VISIT (OUTPATIENT)
Dept: SLEEP MEDICINE | Facility: MEDICAL CENTER | Age: 74
End: 2019-09-11
Payer: MEDICARE

## 2019-09-11 VITALS
RESPIRATION RATE: 16 BRPM | SYSTOLIC BLOOD PRESSURE: 152 MMHG | WEIGHT: 203.5 LBS | OXYGEN SATURATION: 98 % | BODY MASS INDEX: 29.13 KG/M2 | HEIGHT: 70 IN | DIASTOLIC BLOOD PRESSURE: 88 MMHG | HEART RATE: 81 BPM

## 2019-09-11 DIAGNOSIS — Z87.891 FORMER SMOKER: ICD-10-CM

## 2019-09-11 DIAGNOSIS — R93.89 ABNORMAL CHEST X-RAY: ICD-10-CM

## 2019-09-11 DIAGNOSIS — G47.33 OBSTRUCTIVE SLEEP APNEA SYNDROME: ICD-10-CM

## 2019-09-11 PROCEDURE — 99214 OFFICE O/P EST MOD 30 MIN: CPT | Performed by: INTERNAL MEDICINE

## 2019-09-15 NOTE — PROGRESS NOTES
CC:  Sleep apnea hypopnea syndrome, abnormal chest CT scan.     HPI: Mr. Pham has a history of obstructive sleep apnea hypopnea syndrome. Diagnostic polysomnography on May 21, 2014 demonstrated an apnea hypopnea index of 6.3 events per hour but a minimum arterial oxygen saturation of 72% on room air. He had been treated with auto titrating CPAP using a pressure range of 5-20 cm water but felt that the peak pressure was too high. He was empirically adjusted down to a pressure range of 5-16 cm water and titration polysomnography was arranged. That study on February 19, 2017 demonstrated relatively few events at CPAP pressures of 5-8 cm water including supine and REM sleep time. As the CPAP pressure was increased some central apnea episodes appeared prompting the institution of BiPAP therapy, increased to a final pressure of 19/14 cm water. In reviewing the study data, I think that central apneas primarily represented an overtreatment effect.     He has continued to use the auto titrating CPAP each night, throughout the night. He is not having usual problems with fit, leakage or airway dryness. He enjoys reasonable levels of daytime alertness and is not falling asleep inappropriately.      The CPAP data recording chip information is reviewed with the patient. It demonstrates use on 28of the last 30 days with an average daily usage of 6 hours and 38 minutes. The mean pressure is 5.5 cm water with an maximum peak pressure of 8.1. There is a significant leak and the estimated residual apnea hypopnea index is 8.9 events per hour.      He is also followed for an abnormal chest CT scan. He was initially evaluated after a clinical episode of pneumonia early in 2014. A CT scan of the chest on March 18, 2014 demonstrated an 18 mm spiculated density in the right suprahilar area. A follow-up CT scan on August 27, 2014 demonstrated a reduction in the scope and density of the right suprahilar abnormality with a maximum diameter  of 9 cm. Resolving pneumonia was suspected. A follow-up CT scan on February 18, 2015 suggested a possible slight increase in nodule diameter to 10 mm but follow-up scans on February 22, 2016 and July 6, 2016 demonstrated stability in the nodule. A CT scan dated February 23, 2017 again demonstrates a 10 mm spiculated right upper lobe nodule unchanged compared with the prior study. In addition there was a stable subpleural 3 mm left lower lobe nodule and a 4 mm right lower lobe subpleural nodule, both unchanged since June 16, 2008. There is still minimal right middle lobe scarring or possible bronchiectasis and no significant adenopathy or effusion. On cuts through the upper abdomen, the splenic diameter remained a bit enlarged at 15 cm. A CT scan of the chest on September 28, 2017 demonstrated an unchanged 10 mm spiculated pulmonary nodule in the medial aspect of the right upper lobe and the previously identified 6 mm right lower lobe subpleural nodule and 3 mm noncalcified subpleural left lower lobe nodule, both unchanged.  There are no new nodules or masses.  The minimal change in the medial aspect of the right middle lobe has completely resolved and there is no unusual adenopathy.     He was hospitalized at [place] last spring ago for community-acquired pneumonia with fever, productive cough and radiographic infiltrates. He responded well to antibiotics and seems to have recovered clinically with no persisting sputum production or constitutional symptoms. A CT scan of the chest on at [place] on April 26, 2018 demonstrated left lower lobe consolidation, consistent with the community acquired pneumonia at that time and stability in the 9 mm right upper lobe an 8 mm right lower lobe nodules along with some stable bronchiectasis.He saw Dr. Lebron at [place] who did a follow-up chest x-ray in the office about 4 weeks after the hospitalization and of that evaluation apparently demonstrated resolution of the left  lower lobe pneumonia.     He has been followed by Dr. Ochoa and now by Dr. Spears for pancytopenia and possible myelodysplastic syndrome. He's had no bleeding or recent symptoms of infection. He did undergo a recent bone marrow biopsy.        Patient Active Problem List    Diagnosis Date Noted   • Obesity (BMI 30-39.9) 10/16/2018   • CAD (coronary artery disease) 08/31/2011   • Dyslipidemia 08/31/2011       Past Medical History:   Diagnosis Date   • Benign prostatic hypertrophy    • CAD (coronary artery disease) 8/31/2011   • Chickenpox    • COPD (chronic obstructive pulmonary disease) (HCC)    • Emphysema lung (HCC)    • [place] measles    • Influenza    • Mumps    • Pulmonary nodule    • Thrombocytopenia (HCC)    • Tonsillitis        Past Surgical History:   Procedure Laterality Date   • EGD WITH ASP/BX  3/13/2012    Performed by RANI NÚÑEZ at SURGERY [facility] ORS   • GASTROSCOPY WITH BIOPSY  3/13/2012    Performed by RANI NÚÑEZ at [facility] ORS   • ERCP W/SPHINCTEROTOMY/PAPILL.  3/13/2012    Performed by RANI NÚÑEZ at [facility] ORS   • UMBILICAL HERNIA REPAIR  4/17/08    Performed by MIGUEL A BETANCOURT at SURGERY SAME DAY [facility] ORS   • APPENDECTOMY     • CHOLECYSTECTOMY     • HERNIA REPAIR      BILATERAL   • TONSILLECTOMY     • UROLOGY SURGERY         Family History   Problem Relation Age of Onset   • Lung Disease Mother    • Lung Cancer Mother    • Lung Disease Father    • Respiratory Disease Father        Social History     Socioeconomic History   • Marital status:      Spouse name: Not on file   • Number of children: Not on file   • Years of education: Not on file   • Highest education level: Not on file   Occupational History   • Not on file   Social Needs   • Financial resource strain: Not on file   • Food insecurity:     Worry: Not on file     Inability: Not on file   • Transportation needs:     Medical: Not on file     Non-medical: Not on file   Tobacco  Use   • Smoking status: Former Smoker     Packs/day: 0.50     Years: 40.00     Pack years: 20.00     Last attempt to quit: 2001     Years since quittin.0   • Smokeless tobacco: Never Used   Substance and Sexual Activity   • Alcohol use: Yes     Alcohol/week: 0.6 oz     Types: 1 Glasses of wine per week     Comment: 1 glass of wine each week, occasionally   • Drug use: No   • Sexual activity: Not on file   Lifestyle   • Physical activity:     Days per week: Not on file     Minutes per session: Not on file   • Stress: Not on file   Relationships   • Social connections:     Talks on phone: Not on file     Gets together: Not on file     Attends Anabaptist service: Not on file     Active member of club or organization: Not on file     Attends meetings of clubs or organizations: Not on file     Relationship status: Not on file   • Intimate partner violence:     Fear of current or ex partner: Not on file     Emotionally abused: Not on file     Physically abused: Not on file     Forced sexual activity: Not on file   Other Topics Concern   • Not on file   Social History Narrative   • Not on file       Current Outpatient Medications   Medication Sig Dispense Refill   • Red Yeast Rice 600 MG Cap Take  by mouth.     • ursodiol (KARYN 250) 250 MG Tab Take 250 mg by mouth 4 times a day. RX     • omeprazole (PRILOSEC) 20 MG CPDR Take 40 mg by mouth 2 times a day. RX     • B Complex Vitamins (B-COMPLEX/B-12 SL) Place  under tongue.     • Inulin (FIBER CHOICE PO) Take  by mouth.     • Niacin (VITAMIN B-3 PO) Take  by mouth.     • Magnesium 100 MG Cap Take  by mouth.     • Flaxseed, Linseed, (FLAX SEED OIL) 1000 MG Cap Take  by mouth.     • zolpidem (AMBIEN) 5 MG Tab Take 1 Tab by mouth at bedtime as needed for Sleep (insomnia). Take 1 tab at bedtime as needed for insomnia. (Patient not taking: Reported on 2017) 3 Tab 0   • pravastatin (PRAVACHOL) 20 MG TABS Take 1.5 Tabs by mouth every evening. (Patient not taking:  "Reported on 1/23/2017) 135 Each 3   • MULTIPLE VITAMIN PO Take  by mouth.       No current facility-administered medications for this visit.     \"CURRENT RX\"      Allergies: Patient has no known allergies.      ROS  Unchanged from prior and notable for the sleep, respiratory, cardiac and hematologic issues reviewed above. No new problems.      Physical Exam:   /88 (BP Location: Left arm, Patient Position: Sitting, BP Cuff Size: Adult)   Pulse 81   Resp 16   Ht 1.778 m (5' 10\")   Wt 92.3 kg (203 lb 8 oz)   SpO2 98%   BMI 29.20 kg/m²    Head and neck examination demonstrates no mucosal lesion, purulent drainage or evident polyps. The pharynx is benign with a Mallampati II presentation. The neck is supple without thyromegaly. On chest examination there are symmetrical bilateral breath sounds without rales, wheezing or consolidation. On cardiac examination, the apical impulse and heart sounds are normal and the rhythm is regular. There is no murmur, gallop or rub and no jugular venous distention. The abdomen is soft with active bowel sounds and no palpable hepatosplenomegaly, mass, guarding or rebound. The extremities show no clubbing, cyanosis or edema and no signs of deep venous thrombosis. There is no warmth, redness, tenderness or palpable venous cord in the calves. The skin is clear, warm and dry. There is no unusual peripheral lymphadenopathy. Peripheral pulses are palpable in all 4 extremities. On neurologic examination, cranial nerve function is intact, motor tone is symmetrical, and the patient is alert, oriented and responsive.       Problems:  1. Obstructive sleep apnea syndrome  He has at least mild obstructive sleep apnea hypopnea syndrome, confirmed by polysomnography and associated with significant hypoxemia. He is doing well on auto titrating CPAP, using the machine regularly as confirmed by the data recording chip. He has reasonable levels of daytime alertness. As described above, I think " that the central apnea episodes seen in the previous titration polysomnogram represent an overtreatment effect. His clinical response and the CPAP data recording chip suggest that the current CPAP pressures of 5-8 cm water are sufficient to correct his sleep-disordered breathing. He does describe some mask leak and the chip does suggest a significant leak as well.    2. Abnormal chest x-ray  The right suprahilar density was first noticed after an episode of clinical pneumonia and improved on follow-up imaging. That abnormality and the bilateral subcentimeter lower lobe subpleural nodules remained stable  for nearly 3 years and neoplasm is very unlikely. He did have a recent episode of community-acquired pneumonia which is resolved clinically and radiographically.  Follow-up CT scan next spring might be reasonable.    3. Former smoker      Plan:   1.  Continue auto titrating CPAP at 5 to 14 cm water pressure with the nasal mask.  Mask fitting will be reevaluated.    2.  Follow-up with hematology as scheduled.    3.  Return visit here in 6 months, or sooner if new problems develop.    We appreciate the opportunity to assist in his care.  Return in about 6 months (around 3/11/2020).

## 2019-11-06 ENCOUNTER — HOSPITAL ENCOUNTER (OUTPATIENT)
Dept: HOSPITAL 8 - CVU | Age: 74
Discharge: HOME | End: 2019-11-06
Attending: FAMILY MEDICINE
Payer: MEDICARE

## 2019-11-06 DIAGNOSIS — D46.9: Primary | ICD-10-CM

## 2019-11-06 DIAGNOSIS — Z01.818: ICD-10-CM

## 2019-11-06 DIAGNOSIS — Z76.82: ICD-10-CM

## 2019-11-06 PROCEDURE — 94726 PLETHYSMOGRAPHY LUNG VOLUMES: CPT

## 2019-11-06 PROCEDURE — 94010 BREATHING CAPACITY TEST: CPT

## 2019-11-06 PROCEDURE — 94729 DIFFUSING CAPACITY: CPT

## 2019-11-06 PROCEDURE — 93306 TTE W/DOPPLER COMPLETE: CPT

## 2019-11-06 PROCEDURE — 0399T: CPT

## 2020-04-09 ENCOUNTER — HOSPITAL ENCOUNTER (OUTPATIENT)
Dept: RADIOLOGY | Facility: MEDICAL CENTER | Age: 75
End: 2020-04-09
Attending: INTERNAL MEDICINE
Payer: MEDICARE

## 2020-04-09 DIAGNOSIS — Z51.11 ENCOUNTER FOR ANTINEOPLASTIC CHEMOTHERAPY: ICD-10-CM

## 2020-04-09 DIAGNOSIS — D46.9 MYELODYSPLASTIC SYNDROME (HCC): ICD-10-CM

## 2020-04-09 DIAGNOSIS — R16.1 SPLEEN ENLARGEMENT: ICD-10-CM

## 2020-04-09 PROCEDURE — 76700 US EXAM ABDOM COMPLETE: CPT

## 2020-04-20 ENCOUNTER — TELEMEDICINE (OUTPATIENT)
Dept: SLEEP MEDICINE | Facility: MEDICAL CENTER | Age: 75
End: 2020-04-20
Payer: MEDICARE

## 2020-04-20 VITALS — WEIGHT: 200 LBS | BODY MASS INDEX: 28 KG/M2 | HEIGHT: 71 IN

## 2020-04-20 DIAGNOSIS — Z87.891 FORMER SMOKER: ICD-10-CM

## 2020-04-20 DIAGNOSIS — R93.89 ABNORMAL CHEST X-RAY: ICD-10-CM

## 2020-04-20 DIAGNOSIS — G47.33 OBSTRUCTIVE SLEEP APNEA SYNDROME: ICD-10-CM

## 2020-04-20 PROCEDURE — 99214 OFFICE O/P EST MOD 30 MIN: CPT | Mod: CR,95 | Performed by: INTERNAL MEDICINE

## 2020-04-21 NOTE — PROGRESS NOTES
CC:  Sleep apnea hypopnea syndrome, abnormal chest CT scan.     HPI: Mr. Pham was last evaluated here in September 11, 2019.   This evaluation was conducted via telemedicine using secure encrypted software.  The patient was physically located in his home and the physician was located in the [facility] in [facility].  The medical assistant at the originating site provided assistance with the medical history.  The patient's identity was confirmed and verbal consent for the telemedicine interview was obtained.    He has a history of obstructive sleep apnea hypopnea syndrome. Diagnostic polysomnography on May 21, 2014 demonstrated an apnea hypopnea index of 6.3 events per hour but a minimum arterial oxygen saturation of 72% on room air. He had been treated with auto titrating CPAP using a pressure range of 5-20 cm water but felt that the peak pressure was too high. He was empirically adjusted down to a pressure range of 5-16 cm water and titration polysomnography was arranged. That study on February 19, 2017 demonstrated relatively few events at CPAP pressures of 5-8 cm water including supine and REM sleep time. As the CPAP pressure was increased some central apnea episodes appeared prompting the institution of BiPAP therapy, increased to a final pressure of 19/14 cm water. In reviewing the study data, I think that central apneas primarily represented an overtreatment effect.     He has continued to use the auto titrating CPAP regularly although he did miss a number of days earlier in the month.  His wife succumbed to pancreatic carcinoma last month after a long illness.  He is not having usual problems with mask fit, leakage or airway dryness. He enjoys reasonable levels of daytime alertness and is not falling asleep inappropriately.      The CPAP data recording chip information is reviewed with the patient. It demonstrates use on 20 of the last 30 days with an average daily usage of 6 hours and  36 minutes. The mean pressure is 6.5 cm water with an maximum peak pressure of 9.5. There is a significant leak and the estimated residual apnea hypopnea index is 12.5 events per hour.      He is also followed for an abnormal chest CT scan. He was initially evaluated after a clinical episode of pneumonia early in 2014. A CT scan of the chest on March 18, 2014 demonstrated an 18 mm spiculated density in the right suprahilar area. A follow-up CT scan on August 27, 2014 demonstrated a reduction in the scope and density of the right suprahilar abnormality with a maximum diameter of 9 cm. Resolving pneumonia was suspected. A follow-up CT scan on February 18, 2015 suggested a possible slight increase in nodule diameter to 10 mm but follow-up scans on February 22, 2016 and July 6, 2016 demonstrated stability in the nodule. A CT scan dated February 23, 2017 again demonstrates a 10 mm spiculated right upper lobe nodule unchanged compared with the prior study. In addition there was a stable subpleural 3 mm left lower lobe nodule and a 4 mm right lower lobe subpleural nodule, both unchanged since June 16, 2008. There is still minimal right middle lobe scarring or possible bronchiectasis and no significant adenopathy or effusion. On cuts through the upper abdomen, the splenic diameter remained a bit enlarged at 15 cm. A CT scan of the chest on September 28, 2017 demonstrated an unchanged 10 mm spiculated pulmonary nodule in the medial aspect of the right upper lobe and the previously identified 6 mm right lower lobe subpleural nodule and 3 mm noncalcified subpleural left lower lobe nodule, both unchanged.  There are no new nodules or masses.  The minimal change in the medial aspect of the right middle lobe has completely resolved and there is no unusual adenopathy.     He was hospitalized at [place] last spring ago for community-acquired pneumonia with fever, productive cough and radiographic infiltrates. He responded well  to antibiotics and seems to have recovered clinically with no persisting sputum production or constitutional symptoms. A CT scan of the chest on at [facility] on April 26, 2018 demonstrated left lower lobe consolidation, consistent with the community acquired pneumonia at that time and stability in the 9 mm right upper lobe an 8 mm right lower lobe nodules along with some stable bronchiectasis.He saw Dr. Lebron at [facility] who did a follow-up chest x-ray in the office about 4 weeks after the hospitalization and of that evaluation apparently demonstrated resolution of the left lower lobe pneumonia.     He has been followed by Dr. Ochoa and now by Dr. Spears for pancytopenia and possible myelodysplastic syndrome. He's had no bleeding or recent symptoms of infection. He has required periodic transfusions of platelets and red blood cells.  He is considering the possibility of bone marrow transplant.        Patient Active Problem List    Diagnosis Date Noted   • Obesity (BMI 30-39.9) 10/16/2018   • CAD (coronary artery disease) 08/31/2011   • Dyslipidemia 08/31/2011       Past Medical History:   Diagnosis Date   • Benign prostatic hypertrophy    • CAD (coronary artery disease) 8/31/2011   • Chickenpox    • COPD (chronic obstructive pulmonary disease) (HCC)    • Emphysema lung (HCC)    • Thai measles    • Influenza    • Mumps    • Pulmonary nodule    • Thrombocytopenia (HCC)    • Tonsillitis        Past Surgical History:   Procedure Laterality Date   • EGD WITH ASP/BX  3/13/2012    Performed by RANI NÚÑEZ at SURGERY [facility] ORS   • GASTROSCOPY WITH BIOPSY  3/13/2012    Performed by RANI NÚÑEZ at [facility] ORS   • ERCP W/SPHINCTEROTOMY/PAPILL.  3/13/2012    Performed by RANI NÚÑEZ at [facility] ORS   • UMBILICAL HERNIA REPAIR  4/17/08    Performed by MIGUEL A BETANCOURT at SURGERY SAME DAY [facility] ORS   • APPENDECTOMY     • CHOLECYSTECTOMY     • HERNIA REPAIR      BILATERAL    • TONSILLECTOMY     • UROLOGY SURGERY         Family History   Problem Relation Age of Onset   • Lung Disease Mother    • Lung Cancer Mother    • Lung Disease Father    • Respiratory Disease Father        Social History     Socioeconomic History   • Marital status:      Spouse name: Not on file   • Number of children: Not on file   • Years of education: Not on file   • Highest education level: Not on file   Occupational History   • Not on file   Social Needs   • Financial resource strain: Not on file   • Food insecurity     Worry: Not on file     Inability: Not on file   • Transportation needs     Medical: Not on file     Non-medical: Not on file   Tobacco Use   • Smoking status: Former Smoker     Packs/day: 0.50     Years: 40.00     Pack years: 20.00     Last attempt to quit: 2001     Years since quittin.6   • Smokeless tobacco: Never Used   Substance and Sexual Activity   • Alcohol use: Yes     Alcohol/week: 0.6 oz     Types: 1 Glasses of wine per week     Comment: 1 glass of wine each week, occasionally   • Drug use: No   • Sexual activity: Not on file   Lifestyle   • Physical activity     Days per week: Not on file     Minutes per session: Not on file   • Stress: Not on file   Relationships   • Social connections     Talks on phone: Not on file     Gets together: Not on file     Attends Restorationist service: Not on file     Active member of club or organization: Not on file     Attends meetings of clubs or organizations: Not on file     Relationship status: Not on file   • Intimate partner violence     Fear of current or ex partner: Not on file     Emotionally abused: Not on file     Physically abused: Not on file     Forced sexual activity: Not on file   Other Topics Concern   • Not on file   Social History Narrative   • Not on file       Current Outpatient Medications   Medication Sig Dispense Refill   • Red Yeast Rice 600 MG Cap Take  by mouth.     • B Complex Vitamins (B-COMPLEX/B-12 SL)  "Place  under tongue.     • Inulin (FIBER CHOICE PO) Take  by mouth.     • Niacin (VITAMIN B-3 PO) Take  by mouth.     • Magnesium 100 MG Cap Take  by mouth.     • Flaxseed, Linseed, (FLAX SEED OIL) 1000 MG Cap Take  by mouth.     • ursodiol (KARYN 250) 250 MG Tab Take 250 mg by mouth 4 times a day. RX     • omeprazole (PRILOSEC) 20 MG CPDR Take 40 mg by mouth 2 times a day. RX     • MULTIPLE VITAMIN PO Take  by mouth.     • zolpidem (AMBIEN) 5 MG Tab Take 1 Tab by mouth at bedtime as needed for Sleep (insomnia). Take 1 tab at bedtime as needed for insomnia. (Patient not taking: Reported on 2/27/2017) 3 Tab 0   • pravastatin (PRAVACHOL) 20 MG TABS Take 1.5 Tabs by mouth every evening. (Patient not taking: Reported on 1/23/2017) 135 Each 3     No current facility-administered medications for this visit.     \"CURRENT RX\"      Allergies: Patient has no known allergies.      ROS  Unchanged from prior and notable for the sleep, respiratory, cardiac and hematologic issues reviewed above. No new problems.      Physical Exam:   Ht 1.791 m (5' 10.5\")   Wt 90.7 kg (200 lb)   BMI 28.29 kg/m²    Limited by the telemedicine interface.  He is a well-developed well-nourished man who is alert, oriented and appropriately responsive.  He does not appear dyspneic.      Problems:  1. Obstructive sleep apnea syndrome  He has at least mild obstructive sleep apnea hypopnea syndrome, confirmed by polysomnography and associated with significant hypoxemia. He is doing well on auto titrating CPAP, using the machine regularly as confirmed by the data recording chip. He has reasonable levels of daytime alertness. As described above, I suspect that the central apnea episodes seen in the previous titration polysomnogram represented an overtreatment effect but he is still having a few residual apnea events on CPAP. His clinical response and the CPAP data recording chip suggest that the current CPAP pressures of 5-8 cm water are sufficient to " correct his sleep-disordered breathing. He does describe some mask leak and the chip does suggest a significant leak as well.    2. Abnormal chest x-ray  The right suprahilar density was first noticed after an episode of clinical pneumonia and improved on follow-up imaging. That abnormality and the bilateral subcentimeter lower lobe subpleural nodules remained stable  for nearly 3 years and neoplasm is very unlikely. He did have a recent episode of community-acquired pneumonia which is resolved clinically and radiographically.    3. Former smoker        Plan:   1.  Continue auto titrating CPAP at 5 to 14 cm water pressure with the nasal mask.  Mask fitting will be reevaluated.     2.  Follow-up with hematology as scheduled.     3.  Return visit here in 6 months, or sooner if new problems develop.     We appreciate the opportunity to assist in his care.    Return in about 6 months (around 10/20/2020).

## 2020-06-18 ENCOUNTER — APPOINTMENT (RX ONLY)
Dept: URBAN - METROPOLITAN AREA CLINIC 20 | Facility: CLINIC | Age: 75
Setting detail: DERMATOLOGY
End: 2020-06-18

## 2020-06-18 DIAGNOSIS — D18.0 HEMANGIOMA: ICD-10-CM

## 2020-06-18 DIAGNOSIS — D22 MELANOCYTIC NEVI: ICD-10-CM

## 2020-06-18 DIAGNOSIS — L82.0 INFLAMED SEBORRHEIC KERATOSIS: ICD-10-CM

## 2020-06-18 PROBLEM — D22.5 MELANOCYTIC NEVI OF TRUNK: Status: ACTIVE | Noted: 2020-06-18

## 2020-06-18 PROBLEM — D18.01 HEMANGIOMA OF SKIN AND SUBCUTANEOUS TISSUE: Status: ACTIVE | Noted: 2020-06-18

## 2020-06-18 PROCEDURE — 99213 OFFICE O/P EST LOW 20 MIN: CPT | Mod: 25

## 2020-06-18 PROCEDURE — ? LIQUID NITROGEN

## 2020-06-18 PROCEDURE — 17110 DESTRUCTION B9 LES UP TO 14: CPT

## 2020-06-18 PROCEDURE — ? COUNSELING

## 2020-06-18 ASSESSMENT — LOCATION SIMPLE DESCRIPTION DERM
LOCATION SIMPLE: RIGHT LOWER BACK
LOCATION SIMPLE: LEFT LOWER BACK
LOCATION SIMPLE: LEFT UPPER BACK
LOCATION SIMPLE: RIGHT UPPER BACK
LOCATION SIMPLE: ABDOMEN

## 2020-06-18 ASSESSMENT — LOCATION ZONE DERM: LOCATION ZONE: TRUNK

## 2020-06-18 ASSESSMENT — LOCATION DETAILED DESCRIPTION DERM
LOCATION DETAILED: RIGHT INFERIOR LATERAL MIDBACK
LOCATION DETAILED: LEFT INFERIOR UPPER BACK
LOCATION DETAILED: PERIUMBILICAL SKIN
LOCATION DETAILED: LEFT INFERIOR MEDIAL MIDBACK
LOCATION DETAILED: EPIGASTRIC SKIN
LOCATION DETAILED: RIGHT MID-UPPER BACK

## 2020-09-18 ENCOUNTER — APPOINTMENT (RX ONLY)
Dept: URBAN - METROPOLITAN AREA CLINIC 20 | Facility: CLINIC | Age: 75
Setting detail: DERMATOLOGY
End: 2020-09-18

## 2020-09-18 DIAGNOSIS — L82.1 OTHER SEBORRHEIC KERATOSIS: ICD-10-CM

## 2020-09-18 DIAGNOSIS — L81.4 OTHER MELANIN HYPERPIGMENTATION: ICD-10-CM

## 2020-09-18 DIAGNOSIS — Z71.89 OTHER SPECIFIED COUNSELING: ICD-10-CM

## 2020-09-18 DIAGNOSIS — R52 PAIN, UNSPECIFIED: ICD-10-CM

## 2020-09-18 DIAGNOSIS — D18.0 HEMANGIOMA: ICD-10-CM

## 2020-09-18 DIAGNOSIS — L57.0 ACTINIC KERATOSIS: ICD-10-CM

## 2020-09-18 DIAGNOSIS — D22 MELANOCYTIC NEVI: ICD-10-CM

## 2020-09-18 PROBLEM — D22.62 MELANOCYTIC NEVI OF LEFT UPPER LIMB, INCLUDING SHOULDER: Status: ACTIVE | Noted: 2020-09-18

## 2020-09-18 PROBLEM — D22.5 MELANOCYTIC NEVI OF TRUNK: Status: ACTIVE | Noted: 2020-09-18

## 2020-09-18 PROBLEM — D22.61 MELANOCYTIC NEVI OF RIGHT UPPER LIMB, INCLUDING SHOULDER: Status: ACTIVE | Noted: 2020-09-18

## 2020-09-18 PROBLEM — D18.01 HEMANGIOMA OF SKIN AND SUBCUTANEOUS TISSUE: Status: ACTIVE | Noted: 2020-09-18

## 2020-09-18 PROBLEM — D23.72 OTHER BENIGN NEOPLASM OF SKIN OF LEFT LOWER LIMB, INCLUDING HIP: Status: ACTIVE | Noted: 2020-09-18

## 2020-09-18 PROBLEM — D22.71 MELANOCYTIC NEVI OF RIGHT LOWER LIMB, INCLUDING HIP: Status: ACTIVE | Noted: 2020-09-18

## 2020-09-18 PROBLEM — D22.72 MELANOCYTIC NEVI OF LEFT LOWER LIMB, INCLUDING HIP: Status: ACTIVE | Noted: 2020-09-18

## 2020-09-18 PROBLEM — D23.71 OTHER BENIGN NEOPLASM OF SKIN OF RIGHT LOWER LIMB, INCLUDING HIP: Status: ACTIVE | Noted: 2020-09-18

## 2020-09-18 PROCEDURE — ? LIQUID NITROGEN

## 2020-09-18 PROCEDURE — ? COUNSELING

## 2020-09-18 PROCEDURE — ? ADDITIONAL NOTES

## 2020-09-18 PROCEDURE — 99214 OFFICE O/P EST MOD 30 MIN: CPT | Mod: 25

## 2020-09-18 PROCEDURE — 17003 DESTRUCT PREMALG LES 2-14: CPT

## 2020-09-18 PROCEDURE — 17000 DESTRUCT PREMALG LESION: CPT

## 2020-09-18 ASSESSMENT — LOCATION DETAILED DESCRIPTION DERM
LOCATION DETAILED: LEFT INFERIOR UPPER BACK
LOCATION DETAILED: LEFT ANTERIOR DISTAL UPPER ARM
LOCATION DETAILED: RIGHT SUPERIOR LATERAL MIDBACK
LOCATION DETAILED: RIGHT ANTERIOR PROXIMAL THIGH
LOCATION DETAILED: LEFT PERIANAL SKIN
LOCATION DETAILED: RIGHT MEDIAL INFERIOR CHEST
LOCATION DETAILED: LEFT ANTERIOR PROXIMAL THIGH
LOCATION DETAILED: RIGHT FOREHEAD
LOCATION DETAILED: POSTERIOR MID-PARIETAL SCALP
LOCATION DETAILED: RIGHT VENTRAL PROXIMAL FOREARM
LOCATION DETAILED: LEFT PROXIMAL DORSAL FOREARM
LOCATION DETAILED: RIGHT MID-UPPER BACK
LOCATION DETAILED: LEFT CENTRAL MALAR CHEEK
LOCATION DETAILED: RIGHT PROXIMAL CALF
LOCATION DETAILED: LEFT DISTAL POSTERIOR THIGH
LOCATION DETAILED: RIGHT VENTRAL DISTAL FOREARM
LOCATION DETAILED: RIGHT DISTAL POSTERIOR THIGH
LOCATION DETAILED: LEFT SUPERIOR PARIETAL SCALP
LOCATION DETAILED: RIGHT ANTERIOR DISTAL UPPER ARM
LOCATION DETAILED: LEFT PROXIMAL CALF
LOCATION DETAILED: LEFT VENTRAL DISTAL FOREARM
LOCATION DETAILED: RIGHT PROXIMAL DORSAL FOREARM
LOCATION DETAILED: LEFT RADIAL DORSAL HAND

## 2020-09-18 ASSESSMENT — LOCATION ZONE DERM
LOCATION ZONE: TRUNK
LOCATION ZONE: ANUS
LOCATION ZONE: FACE
LOCATION ZONE: SCALP
LOCATION ZONE: LEG
LOCATION ZONE: HAND
LOCATION ZONE: ARM

## 2020-09-18 ASSESSMENT — LOCATION SIMPLE DESCRIPTION DERM
LOCATION SIMPLE: ANUS
LOCATION SIMPLE: LEFT CALF
LOCATION SIMPLE: POSTERIOR SCALP
LOCATION SIMPLE: RIGHT UPPER ARM
LOCATION SIMPLE: CHEST
LOCATION SIMPLE: RIGHT FOREARM
LOCATION SIMPLE: LEFT POSTERIOR THIGH
LOCATION SIMPLE: LEFT CHEEK
LOCATION SIMPLE: RIGHT THIGH
LOCATION SIMPLE: RIGHT LOWER BACK
LOCATION SIMPLE: LEFT UPPER ARM
LOCATION SIMPLE: LEFT HAND
LOCATION SIMPLE: LEFT THIGH
LOCATION SIMPLE: RIGHT POSTERIOR THIGH
LOCATION SIMPLE: RIGHT CALF
LOCATION SIMPLE: LEFT UPPER BACK
LOCATION SIMPLE: RIGHT FOREHEAD
LOCATION SIMPLE: RIGHT UPPER BACK
LOCATION SIMPLE: LEFT FOREARM
LOCATION SIMPLE: SCALP

## 2020-09-18 NOTE — PROCEDURE: MIPS QUALITY
Quality 111:Pneumonia Vaccination Status For Older Adults: Pneumococcal Vaccination Previously Received
Detail Level: Detailed
Quality 402: Tobacco Use And Help With Quitting Among Adolescents: Patient screened for tobacco and never smoked
Quality 130: Documentation Of Current Medications In The Medical Record: Current Medications Documented
Quality 110: Preventive Care And Screening: Influenza Immunization: Influenza Immunization Administered during Influenza season
Quality 226: Preventive Care And Screening: Tobacco Use: Screening And Cessation Intervention: Patient screened for tobacco use and is an ex/non-smoker

## 2020-09-18 NOTE — PROCEDURE: ADDITIONAL NOTES
Additional Notes: Patient states he had a fullness and possibly a skin tag in the rectal area, with burning pain with bowel movements, occurring about 2-3 months ago, improved with time\\nNo lesion on exam - advised patient to discuss this further with his primary care or consider colorectal surgeon if persistent
Detail Level: Simple

## 2020-11-02 ENCOUNTER — HOSPITAL ENCOUNTER (EMERGENCY)
Dept: HOSPITAL 8 - ED | Age: 75
Discharge: HOME | End: 2020-11-02
Payer: MEDICARE

## 2020-11-02 VITALS — HEIGHT: 69 IN | WEIGHT: 202.16 LBS | BODY MASS INDEX: 29.94 KG/M2

## 2020-11-02 VITALS — SYSTOLIC BLOOD PRESSURE: 135 MMHG | DIASTOLIC BLOOD PRESSURE: 69 MMHG

## 2020-11-02 DIAGNOSIS — K64.8: Primary | ICD-10-CM

## 2020-11-02 DIAGNOSIS — Z90.49: ICD-10-CM

## 2020-11-02 DIAGNOSIS — R00.0: ICD-10-CM

## 2020-11-02 DIAGNOSIS — Z87.891: ICD-10-CM

## 2020-11-02 DIAGNOSIS — K62.89: ICD-10-CM

## 2020-11-02 PROCEDURE — 99283 EMERGENCY DEPT VISIT LOW MDM: CPT

## 2020-11-03 ENCOUNTER — TELEPHONE (OUTPATIENT)
Dept: SLEEP MEDICINE | Facility: MEDICAL CENTER | Age: 75
End: 2020-11-03

## 2020-11-03 DIAGNOSIS — G47.33 OSA (OBSTRUCTIVE SLEEP APNEA): ICD-10-CM

## 2020-11-03 NOTE — TELEPHONE ENCOUNTER
Tejinder pt, pt due back 04/2021.    Supplies needed, last seen 4/2020.    Please sign supply order.    DME:  [facility] / katy [phone] / rico [phone]

## 2021-01-15 DIAGNOSIS — Z23 NEED FOR VACCINATION: ICD-10-CM

## 2021-02-27 ENCOUNTER — HOSPITAL ENCOUNTER (EMERGENCY)
Dept: HOSPITAL 8 - ED | Age: 76
Discharge: HOME | End: 2021-02-27
Payer: MEDICARE

## 2021-02-27 VITALS — DIASTOLIC BLOOD PRESSURE: 36 MMHG | SYSTOLIC BLOOD PRESSURE: 116 MMHG

## 2021-02-27 VITALS — SYSTOLIC BLOOD PRESSURE: 100 MMHG | DIASTOLIC BLOOD PRESSURE: 48 MMHG

## 2021-02-27 VITALS — DIASTOLIC BLOOD PRESSURE: 46 MMHG | SYSTOLIC BLOOD PRESSURE: 103 MMHG

## 2021-02-27 VITALS — DIASTOLIC BLOOD PRESSURE: 76 MMHG | SYSTOLIC BLOOD PRESSURE: 116 MMHG

## 2021-02-27 VITALS — DIASTOLIC BLOOD PRESSURE: 64 MMHG | SYSTOLIC BLOOD PRESSURE: 119 MMHG

## 2021-02-27 VITALS — DIASTOLIC BLOOD PRESSURE: 53 MMHG | SYSTOLIC BLOOD PRESSURE: 116 MMHG

## 2021-02-27 VITALS — DIASTOLIC BLOOD PRESSURE: 46 MMHG | SYSTOLIC BLOOD PRESSURE: 115 MMHG

## 2021-02-27 VITALS — DIASTOLIC BLOOD PRESSURE: 54 MMHG | SYSTOLIC BLOOD PRESSURE: 115 MMHG

## 2021-02-27 VITALS — WEIGHT: 202.6 LBS | BODY MASS INDEX: 30.01 KG/M2 | HEIGHT: 69 IN

## 2021-02-27 DIAGNOSIS — D69.6: Primary | ICD-10-CM

## 2021-02-27 DIAGNOSIS — D72.819: ICD-10-CM

## 2021-02-27 DIAGNOSIS — R00.0: ICD-10-CM

## 2021-02-27 DIAGNOSIS — Z90.49: ICD-10-CM

## 2021-02-27 DIAGNOSIS — C94.6: ICD-10-CM

## 2021-02-27 DIAGNOSIS — I10: ICD-10-CM

## 2021-02-27 DIAGNOSIS — Z90.89: ICD-10-CM

## 2021-02-27 DIAGNOSIS — D64.9: ICD-10-CM

## 2021-02-27 LAB
<PLATELET ESTIMATE>: (no result)
ALBUMIN SERPL-MCNC: 2.6 G/DL (ref 3.4–5)
ALP SERPL-CCNC: 76 U/L (ref 45–117)
ALT SERPL-CCNC: 98 U/L (ref 12–78)
ANION GAP SERPL CALC-SCNC: 10 MMOL/L (ref 5–15)
ANISOCYTOSIS BLD QL SMEAR: (no result)
BILIRUB SERPL-MCNC: 3.4 MG/DL (ref 0.2–1)
CALCIUM SERPL-MCNC: 8 MG/DL (ref 8.5–10.1)
CHLORIDE SERPL-SCNC: 111 MMOL/L (ref 98–107)
CREAT SERPL-MCNC: 1.46 MG/DL (ref 0.7–1.3)
ERYTHROCYTE [DISTWIDTH] IN BLOOD BY AUTOMATED COUNT: 25.4 % (ref 9.4–14.8)
LG PLATELETS BLD QL SMEAR: (no result)
LYMPH#(MANUAL): 0.16 X10^3/UL (ref 1–3.4)
LYMPHS% (MANUAL): 39 % (ref 22–44)
MACROCYTES BLD QL SMEAR: (no result)
MCH RBC QN AUTO: 34.5 PG (ref 27.5–34.5)
MCHC RBC AUTO-ENTMCNC: 32.3 G/DL (ref 33.2–36.2)
MD: YES
METAMYELOCYTES# (MANUAL): 0.01 X10^3/UL (ref 0–0)
METAMYELOCYTES% (MANUAL): 2 % (ref 0–1)
MICROCYTES BLD QL SMEAR: (no result)
MICROSCOPIC: (no result)
MONOS#(MANUAL): 0.04 X10^3/UL (ref 0.3–2.7)
MONOS% (MANUAL): 10 % (ref 2–9)
OVALOCYTES BLD QL SMEAR: (no result)
PLATELET # BLD AUTO: 21 X10^3/UL (ref 130–400)
PMV BLD AUTO: 8.8 FL (ref 7.4–10.4)
POLYCHROMASIA BLD QL SMEAR: (no result)
PROT SERPL-MCNC: 5.7 G/DL (ref 6.4–8.2)
RBC # BLD AUTO: 2 X10^6/UL (ref 4.38–5.82)
SEG#(MANUAL): 0.2 X10^3/UL (ref 1.8–6.8)
SEGS% (MANUAL): 49 % (ref 42–75)
TEAR DROPS: (no result)

## 2021-02-27 PROCEDURE — 86900 BLOOD TYPING SEROLOGIC ABO: CPT

## 2021-02-27 PROCEDURE — 86923 COMPATIBILITY TEST ELECTRIC: CPT

## 2021-02-27 PROCEDURE — 93005 ELECTROCARDIOGRAM TRACING: CPT

## 2021-02-27 PROCEDURE — 86902 BLOOD TYPE ANTIGEN DONOR EA: CPT

## 2021-02-27 PROCEDURE — 71275 CT ANGIOGRAPHY CHEST: CPT

## 2021-02-27 PROCEDURE — 71045 X-RAY EXAM CHEST 1 VIEW: CPT

## 2021-02-27 PROCEDURE — 74177 CT ABD & PELVIS W/CONTRAST: CPT

## 2021-02-27 PROCEDURE — 80053 COMPREHEN METABOLIC PANEL: CPT

## 2021-02-27 PROCEDURE — 81003 URINALYSIS AUTO W/O SCOPE: CPT

## 2021-02-27 PROCEDURE — P9040 RBC LEUKOREDUCED IRRADIATED: HCPCS

## 2021-02-27 PROCEDURE — 83690 ASSAY OF LIPASE: CPT

## 2021-02-27 PROCEDURE — 36415 COLL VENOUS BLD VENIPUNCTURE: CPT

## 2021-02-27 PROCEDURE — 86870 RBC ANTIBODY IDENTIFICATION: CPT

## 2021-02-27 PROCEDURE — 86922 COMPATIBILITY TEST ANTIGLOB: CPT

## 2021-02-27 PROCEDURE — 99285 EMERGENCY DEPT VISIT HI MDM: CPT

## 2021-02-27 PROCEDURE — 36430 TRANSFUSION BLD/BLD COMPNT: CPT

## 2021-02-27 PROCEDURE — 86850 RBC ANTIBODY SCREEN: CPT

## 2021-02-27 PROCEDURE — 85025 COMPLETE CBC W/AUTO DIFF WBC: CPT

## 2021-06-09 ENCOUNTER — PRE-ADMISSION TESTING (OUTPATIENT)
Dept: ADMISSIONS | Facility: MEDICAL CENTER | Age: 76
End: 2021-06-09
Attending: SURGERY
Payer: MEDICARE

## 2021-06-09 DIAGNOSIS — Z01.810 PRE-OPERATIVE CARDIOVASCULAR EXAMINATION: ICD-10-CM

## 2021-06-09 DIAGNOSIS — Z01.812 PRE-OPERATIVE LABORATORY EXAMINATION: ICD-10-CM

## 2021-06-09 RX ORDER — TRIAMTERENE AND HYDROCHLOROTHIAZIDE 37.5; 25 MG/1; MG/1
1 CAPSULE ORAL 2 TIMES DAILY
Status: ON HOLD | COMMUNITY
End: 2021-06-18

## 2021-06-17 NOTE — OR NURSING
COVID-19 Pre-surgery screenin. Do you have an undiagnosed respiratory illness or symptoms such as coughing or sneezing? (No)   a. Onset of Sx   b. Acute vs. chronic respiratory illness      2. Do you have an unexplained fever greater than 100.4 degrees Fahrenheit or 38 degrees Celsius?                     (No)      3. Have you had direct exposure to a patient who tested positive for Covid-19?                           (No)      4. Have you traveled within the last 14 days to [place], [place], [place], [place], or [place]?                     (No)      Informed of visitor policy

## 2021-06-18 ENCOUNTER — HOSPITAL ENCOUNTER (OUTPATIENT)
Facility: MEDICAL CENTER | Age: 76
End: 2021-06-18
Attending: SURGERY | Admitting: SURGERY
Payer: MEDICARE

## 2021-06-18 ENCOUNTER — ANESTHESIA (OUTPATIENT)
Dept: SURGERY | Facility: MEDICAL CENTER | Age: 76
End: 2021-06-18
Payer: MEDICARE

## 2021-06-18 ENCOUNTER — ANESTHESIA EVENT (OUTPATIENT)
Dept: SURGERY | Facility: MEDICAL CENTER | Age: 76
End: 2021-06-18
Payer: MEDICARE

## 2021-06-18 VITALS
WEIGHT: 184.3 LBS | HEIGHT: 70 IN | BODY MASS INDEX: 26.39 KG/M2 | RESPIRATION RATE: 16 BRPM | SYSTOLIC BLOOD PRESSURE: 108 MMHG | DIASTOLIC BLOOD PRESSURE: 45 MMHG | TEMPERATURE: 97.1 F | OXYGEN SATURATION: 98 % | HEART RATE: 85 BPM

## 2021-06-18 PROBLEM — K62.5 RECTAL BLEEDING: Chronic | Status: ACTIVE | Noted: 2021-06-18

## 2021-06-18 PROCEDURE — 160009 HCHG ANES TIME/MIN: Performed by: SURGERY

## 2021-06-18 PROCEDURE — 160002 HCHG RECOVERY MINUTES (STAT): Performed by: SURGERY

## 2021-06-18 PROCEDURE — 160038 HCHG SURGERY MINUTES - EA ADDL 1 MIN LEVEL 2: Performed by: SURGERY

## 2021-06-18 PROCEDURE — 160048 HCHG OR STATISTICAL LEVEL 1-5: Performed by: SURGERY

## 2021-06-18 PROCEDURE — 160046 HCHG PACU - 1ST 60 MINS PHASE II: Performed by: SURGERY

## 2021-06-18 PROCEDURE — 160025 RECOVERY II MINUTES (STATS): Performed by: SURGERY

## 2021-06-18 PROCEDURE — 160027 HCHG SURGERY MINUTES - 1ST 30 MINS LEVEL 2: Performed by: SURGERY

## 2021-06-18 PROCEDURE — A6403 STERILE GAUZE>16 <= 48 SQ IN: HCPCS | Performed by: SURGERY

## 2021-06-18 PROCEDURE — A9270 NON-COVERED ITEM OR SERVICE: HCPCS | Performed by: SURGERY

## 2021-06-18 PROCEDURE — 700105 HCHG RX REV CODE 258: Performed by: SURGERY

## 2021-06-18 PROCEDURE — 700101 HCHG RX REV CODE 250: Performed by: ANESTHESIOLOGY

## 2021-06-18 PROCEDURE — 700101 HCHG RX REV CODE 250: Performed by: SURGERY

## 2021-06-18 PROCEDURE — 700111 HCHG RX REV CODE 636 W/ 250 OVERRIDE (IP): Performed by: ANESTHESIOLOGY

## 2021-06-18 PROCEDURE — 160035 HCHG PACU - 1ST 60 MINS PHASE I: Performed by: SURGERY

## 2021-06-18 RX ORDER — ONDANSETRON 2 MG/ML
4 INJECTION INTRAMUSCULAR; INTRAVENOUS
Status: DISCONTINUED | OUTPATIENT
Start: 2021-06-18 | End: 2021-06-18 | Stop reason: HOSPADM

## 2021-06-18 RX ORDER — SODIUM CHLORIDE, SODIUM LACTATE, POTASSIUM CHLORIDE, CALCIUM CHLORIDE 600; 310; 30; 20 MG/100ML; MG/100ML; MG/100ML; MG/100ML
INJECTION, SOLUTION INTRAVENOUS CONTINUOUS
Status: DISCONTINUED | OUTPATIENT
Start: 2021-06-18 | End: 2021-06-18 | Stop reason: HOSPADM

## 2021-06-18 RX ORDER — MEPERIDINE HYDROCHLORIDE 25 MG/ML
12.5 INJECTION INTRAMUSCULAR; INTRAVENOUS; SUBCUTANEOUS
Status: DISCONTINUED | OUTPATIENT
Start: 2021-06-18 | End: 2021-06-18 | Stop reason: HOSPADM

## 2021-06-18 RX ORDER — ROCURONIUM BROMIDE 10 MG/ML
INJECTION, SOLUTION INTRAVENOUS PRN
Status: DISCONTINUED | OUTPATIENT
Start: 2021-06-18 | End: 2021-06-18 | Stop reason: SURG

## 2021-06-18 RX ORDER — ONDANSETRON 2 MG/ML
INJECTION INTRAMUSCULAR; INTRAVENOUS PRN
Status: DISCONTINUED | OUTPATIENT
Start: 2021-06-18 | End: 2021-06-18 | Stop reason: SURG

## 2021-06-18 RX ORDER — DIPHENHYDRAMINE HYDROCHLORIDE 50 MG/ML
12.5 INJECTION INTRAMUSCULAR; INTRAVENOUS
Status: DISCONTINUED | OUTPATIENT
Start: 2021-06-18 | End: 2021-06-18 | Stop reason: HOSPADM

## 2021-06-18 RX ORDER — BUPIVACAINE HYDROCHLORIDE AND EPINEPHRINE 5; 5 MG/ML; UG/ML
INJECTION, SOLUTION PERINEURAL
Status: DISCONTINUED | OUTPATIENT
Start: 2021-06-18 | End: 2021-06-18 | Stop reason: HOSPADM

## 2021-06-18 RX ORDER — HALOPERIDOL 5 MG/ML
1 INJECTION INTRAMUSCULAR
Status: DISCONTINUED | OUTPATIENT
Start: 2021-06-18 | End: 2021-06-18 | Stop reason: HOSPADM

## 2021-06-18 RX ORDER — OXYCODONE HCL 5 MG/5 ML
10 SOLUTION, ORAL ORAL
Status: DISCONTINUED | OUTPATIENT
Start: 2021-06-18 | End: 2021-06-18 | Stop reason: HOSPADM

## 2021-06-18 RX ORDER — MIDAZOLAM HYDROCHLORIDE 1 MG/ML
1 INJECTION INTRAMUSCULAR; INTRAVENOUS
Status: DISCONTINUED | OUTPATIENT
Start: 2021-06-18 | End: 2021-06-18 | Stop reason: HOSPADM

## 2021-06-18 RX ORDER — LIDOCAINE HYDROCHLORIDE 20 MG/ML
INJECTION, SOLUTION EPIDURAL; INFILTRATION; INTRACAUDAL; PERINEURAL PRN
Status: DISCONTINUED | OUTPATIENT
Start: 2021-06-18 | End: 2021-06-18 | Stop reason: SURG

## 2021-06-18 RX ORDER — OXYCODONE HCL 5 MG/5 ML
5 SOLUTION, ORAL ORAL
Status: DISCONTINUED | OUTPATIENT
Start: 2021-06-18 | End: 2021-06-18 | Stop reason: HOSPADM

## 2021-06-18 RX ORDER — DEXAMETHASONE SODIUM PHOSPHATE 4 MG/ML
INJECTION, SOLUTION INTRA-ARTICULAR; INTRALESIONAL; INTRAMUSCULAR; INTRAVENOUS; SOFT TISSUE PRN
Status: DISCONTINUED | OUTPATIENT
Start: 2021-06-18 | End: 2021-06-18 | Stop reason: SURG

## 2021-06-18 RX ADMIN — DEXAMETHASONE SODIUM PHOSPHATE 8 MG: 4 INJECTION, SOLUTION INTRA-ARTICULAR; INTRALESIONAL; INTRAMUSCULAR; INTRAVENOUS; SOFT TISSUE at 08:48

## 2021-06-18 RX ADMIN — SUGAMMADEX 200 MG: 100 INJECTION, SOLUTION INTRAVENOUS at 09:05

## 2021-06-18 RX ADMIN — ROCURONIUM BROMIDE 50 MG: 10 INJECTION, SOLUTION INTRAVENOUS at 08:39

## 2021-06-18 RX ADMIN — FENTANYL CITRATE 100 MCG: 50 INJECTION, SOLUTION INTRAMUSCULAR; INTRAVENOUS at 08:37

## 2021-06-18 RX ADMIN — POVIDONE IODINE 15 ML: 100 SOLUTION TOPICAL at 08:13

## 2021-06-18 RX ADMIN — LIDOCAINE HYDROCHLORIDE 0.5 ML: 10 INJECTION, SOLUTION EPIDURAL; INFILTRATION; INTRACAUDAL at 08:13

## 2021-06-18 RX ADMIN — SODIUM CHLORIDE, POTASSIUM CHLORIDE, SODIUM LACTATE AND CALCIUM CHLORIDE: 600; 310; 30; 20 INJECTION, SOLUTION INTRAVENOUS at 08:13

## 2021-06-18 RX ADMIN — ONDANSETRON 4 MG: 2 INJECTION INTRAMUSCULAR; INTRAVENOUS at 09:06

## 2021-06-18 RX ADMIN — PROPOFOL 150 MG: 10 INJECTION, EMULSION INTRAVENOUS at 08:39

## 2021-06-18 RX ADMIN — LIDOCAINE HYDROCHLORIDE 40 MG: 20 INJECTION, SOLUTION EPIDURAL; INFILTRATION; INTRACAUDAL at 08:39

## 2021-06-18 ASSESSMENT — PAIN SCALES - GENERAL: PAIN_LEVEL: 2

## 2021-06-18 NOTE — OP REPORT
DATE OF OPERATION: 6/18/2021    PREOPERATIVE DIAGNOSIS: Rectal pain and bleeding  POSTOPERATIVE DIAGNOSIS: Same.    PROCEDURE PERFORMED: Examination under anesthesia with flexible sigmoidoscopy    SURGEON: Tyler Bradley M.D.    ASSISTANT: None    ANESTHESIOLOGIST:  Oz Velázquez MD..    ANESTHESIA: General endotracheal anesthesia.    INDICATIONS: The patient is a 75 y.o. male with a history of pancytopenia with complaints of anal rectal pain and bleeding thought to be related to a fissure.  He is here for an examination under anesthesia with endoscopic evaluation of the distal colon and rectum.    FINDINGS: Formed stool without gross blood in the distal colon and mucosa without abnormalities in the colon and rectum.  I did not perform biopsies given the patient's thrombocytopenia.  I found no evidence of recurrent fissure.  Is grade 2 internal hemorrhoids without gross blood or stigmata of recent bleeding.    ESTIMATED BLOOD LOSS: 0 mL.    PROCEDURE:Informed consent was obtained pre-operatively.  The patient was taken back to the operating room and general endotracheal anesthesia was administered and the patient was intubated. Patient was then transferred to the operating room table in the prone jackknife position with all skin and joint surfaces padded and protected appropriately.Intravenous antibiotics were administered by the anesthesiologist in correct time interval. Sequential compression devices were placed.  The buttocks were gently spread with tape in the presacral region was prepped and draped in usual sterile fashion.  A time-out was performed and the patient and procedure were both verified.      A digital rectal examination was performed and I found no abnormalities.  A generously lubricated flexible sigmoidoscope was inserted into the anus and advanced to approximately 40 cm with findings of stool in the distal colon and several openings consistent with diverticula but no erythema or edema or other  mucosal abnormalities identified in the distal colon or rectum.  I then performed a perianal nerve block with half percent Marcaine with epinephrine and inserted a Hill-Pelletier anoscope and made a detailed inspection of all 4 quadrants of the anal canal and found no abnormalities apart from grade 2 hemorrhoids.    The patient tolerated the procedure well and there were no apparent complications. All sponge, sharps, and instrument counts were correct on 2 separate occasions. The patient was awakened, extubated, and transferred to the PACU in satisfactory condition.     Tyler Bradley MD PhD  [facility]  Colon and Rectal Surgeon  [phone]

## 2021-06-18 NOTE — ANESTHESIA PREPROCEDURE EVALUATION
Relevant Problems   CARDIAC   (positive) CAD (coronary artery disease)      Other   (positive) MDS (myelodysplastic syndrome) (HCC)       Physical Exam    Airway   Mallampati: II  TM distance: >3 FB  Neck ROM: full       Cardiovascular - normal exam  Rhythm: regular  Rate: normal  (-) murmur     Dental - normal exam           Pulmonary - normal exam  Breath sounds clear to auscultation     Abdominal    Neurological - normal exam                 Anesthesia Plan    ASA 3 (chart)       Plan - general       Airway plan will be ETT          Induction: intravenous    Postoperative Plan: Postoperative administration of opioids is intended.    Pertinent diagnostic labs and testing reviewed    Informed Consent:    Anesthetic plan and risks discussed with patient.    Use of blood products discussed with: patient whom consented to blood products.

## 2021-06-18 NOTE — ANESTHESIA TIME REPORT
Anesthesia Start and Stop Event Times     Date Time Event    6/18/2021 0824 Ready for Procedure     0834 Anesthesia Start     0914 Anesthesia Stop        Responsible Staff  06/18/21    Name Role Begin End    Oz Velázquez M.D. Anesth 0834 0914        Preop Diagnosis (Free Text):  Pre-op Diagnosis     ANAL FISSURE        Preop Diagnosis (Codes):    Post op Diagnosis  Rectal bleeding      Premium Reason  Non-Premium    Comments:

## 2021-06-18 NOTE — ANESTHESIA PROCEDURE NOTES
Airway    Date/Time: 6/18/2021 8:40 AM  Performed by: Oz Velázquez M.D.  Authorized by: Oz Velázquez M.D.     Location:  OR  Urgency:  Elective  Difficult Airway: No    Indications for Airway Management:  Anesthesia      Spontaneous Ventilation: absent    Sedation Level:  Deep  Preoxygenated: Yes    Patient Position:  Sniffing  Final Airway Type:  Endotracheal airway  Final Endotracheal Airway:  ETT  Cuffed: Yes    Technique Used for Successful ETT Placement:  Direct laryngoscopy    Insertion Site:  Oral  Blade Type:  Kimberly  Laryngoscope Blade/Videolaryngoscope Blade Size:  3  ETT Size (mm):  8.0  Measured from:  Lips  ETT to Lips (cm):  23  Placement Verified by: auscultation and capnometry    Cormack-Lehane Classification:  Grade IIa - partial view of glottis  Number of Attempts at Approach:  1  Number of Other Approaches Attempted:  0

## 2021-06-18 NOTE — OR NURSING
Pre op admission completed.  Pt educated on surgical plan of care, all questions answered.  Bed in low position and call light within reach.  Hourly rounding in place.  This RN performed excellent hand hygiene and wore a surgical mask at all times.  Pt wore a mask at all times except when doing oral and nasal triple aim care.     Surgeon and anesthesiologist aware of pt's pre op labs - no additional labs needed at this time.  Surgeon aware pt did not do a bowel prep - ok to proceed w/pre op admission.

## 2021-06-18 NOTE — DISCHARGE INSTRUCTIONS
ACTIVITY: Rest and take it easy for the first 24 hours.  A responsible adult is recommended to remain with you during that time.  It is normal to feel sleepy.  We encourage you to not do anything that requires balance, judgment or coordination.    MILD FLU-LIKE SYMPTOMS ARE NORMAL. YOU MAY EXPERIENCE GENERALIZED MUSCLE ACHES, THROAT IRRITATION, HEADACHE AND/OR SOME NAUSEA.    FOR 24 HOURS DO NOT:  Drive, operate machinery or run household appliances.  Drink beer or alcoholic beverages.   Make important decisions or sign legal documents.    SPECIAL INSTRUCTIONS: Follow instructions given to you by MD. Call with any questions.     DIET: To avoid nausea, slowly advance diet as tolerated, avoiding spicy or greasy foods for the first day.  Add more substantial food to your diet according to your physician's instructions.  Babies can be fed formula or breast milk as soon as they are hungry.  INCREASE FLUIDS AND FIBER TO AVOID CONSTIPATION.    SURGICAL DRESSING/BATHING: Keep incision clean and dry. Do not soak or submerge in water until cleared by MD.     FOLLOW-UP APPOINTMENT:  A follow-up appointment should be arranged with your doctor in 1-2 weeks; call to schedule.    You should CALL YOUR PHYSICIAN if you develop:  Fever greater than 101 degrees F.  Pain not relieved by medication, or persistent nausea or vomiting.  Excessive bleeding (blood soaking through dressing) or unexpected drainage from the wound.  Extreme redness or swelling around the incision site, drainage of pus or foul smelling drainage.  Inability to urinate or empty your bladder within 8 hours.  Problems with breathing or chest pain.    You should call 911 if you develop problems with breathing or chest pain.  If you are unable to contact your doctor or surgical center, you should go to the nearest emergency room or urgent care center.  Physician's telephone #: [phone]    If any questions arise, call your doctor.  If your doctor is not available,  please feel free to call the Surgical Center at [phone]. The Contact Center is open Monday through Friday 7AM to 5PM and may speak to a nurse at [phone], or toll free at [phone].     A registered nurse may call you a few days after your surgery to see how you are doing after your procedure.    MEDICATIONS: Resume taking daily medication.  Take prescribed pain medication with food.  If no medication is prescribed, you may take non-aspirin pain medication if needed.  PAIN MEDICATION CAN BE VERY CONSTIPATING.  Take a stool softener or laxative such as senokot, pericolace, or milk of magnesia if needed.        If your physician has prescribed pain medication that includes Acetaminophen (Tylenol), do not take additional Acetaminophen (Tylenol) while taking the prescribed medication.    Depression / Suicide Risk    As you are discharged from this [facility] facility, it is important to learn how to keep safe from harming yourself.    Recognize the warning signs:  · Abrupt changes in personality, positive or negative- including increase in energy   · Giving away possessions  · Change in eating patterns- significant weight changes-  positive or negative  · Change in sleeping patterns- unable to sleep or sleeping all the time   · Unwillingness or inability to communicate  · Depression  · Unusual sadness, discouragement and loneliness  · Talk of wanting to die  · Neglect of personal appearance   · Rebelliousness- reckless behavior  · Withdrawal from people/activities they love  · Confusion- inability to concentrate     If you or a loved one observes any of these behaviors or has concerns about self-harm, here's what you can do:  · Talk about it- your feelings and reasons for harming yourself  · Remove any means that you might use to hurt yourself (examples: pills, rope, extension cords, firearm)  · Get professional help from the community (Mental Health, Substance Abuse, psychological counseling)  · Do  not be alone:Call your Safe Contact- someone whom you trust who will be there for you.  · Call your local CRISIS HOTLINE 067-5687 or 730-026-0708  · Call your local Children's Mobile Crisis Response Team [place] (761) 759-4028 or www.[employer]  · Call the toll free National Suicide Prevention Hotlines   · National Suicide Prevention Lifeline 977-230-IDHY (1450)  · National Ex[employer] Apps Line Network 800-SUICIDE (382-1072)

## 2021-07-01 ENCOUNTER — HOSPITAL ENCOUNTER (OUTPATIENT)
Dept: LAB | Facility: MEDICAL CENTER | Age: 76
End: 2021-07-01
Attending: SURGERY
Payer: MEDICARE

## 2021-07-05 ENCOUNTER — HOSPITAL ENCOUNTER (OUTPATIENT)
Dept: HOSPITAL 8 - LAB | Age: 76
Discharge: HOME | End: 2021-07-05
Attending: INTERNAL MEDICINE
Payer: MEDICARE

## 2021-07-05 DIAGNOSIS — J04.0: ICD-10-CM

## 2021-07-05 DIAGNOSIS — Z51.11: Primary | ICD-10-CM

## 2021-07-05 DIAGNOSIS — R16.1: ICD-10-CM

## 2021-07-05 DIAGNOSIS — D69.6: ICD-10-CM

## 2021-07-05 DIAGNOSIS — Z79.899: ICD-10-CM

## 2021-07-05 DIAGNOSIS — L03.114: ICD-10-CM

## 2021-07-05 DIAGNOSIS — D46.9: ICD-10-CM

## 2021-07-05 DIAGNOSIS — D61.818: ICD-10-CM

## 2021-07-05 DIAGNOSIS — D70.9: ICD-10-CM

## 2021-07-05 DIAGNOSIS — D70.1: ICD-10-CM

## 2021-07-05 DIAGNOSIS — B02.9: ICD-10-CM

## 2021-07-05 LAB
<PLATELET ESTIMATE>: (no result)
<PLT MORPHOLOGY>: (no result)
ANISOCYTOSIS BLD QL SMEAR: (no result)
BAND#(MANUAL): 0.01 X10^3/UL
ERYTHROCYTE [DISTWIDTH] IN BLOOD BY AUTOMATED COUNT: 16.3 % (ref 9.4–14.8)
LYMPH#(MANUAL): 0.1 X10^3/UL (ref 1–3.4)
LYMPHS% (MANUAL): 34 % (ref 22–44)
MACROCYTES BLD QL SMEAR: (no result)
MCH RBC QN AUTO: 31.1 PG (ref 27.5–34.5)
MCHC RBC AUTO-ENTMCNC: 34.4 G/DL (ref 33.2–36.2)
MICROCYTES BLD QL SMEAR: (no result)
MONOS#(MANUAL): 0.02 X10^3/UL (ref 0.3–2.7)
MONOS% (MANUAL): 6 % (ref 2–9)
NEUTS BAND NFR BLD: 2 % (ref 0–7)
OVALOCYTES BLD QL SMEAR: (no result)
PLATELET # BLD AUTO: 7 X10^3/UL (ref 130–400)
PMV BLD AUTO: 10.2 FL (ref 7.4–10.4)
POLYCHROMASIA BLD QL SMEAR: (no result)
RBC # BLD AUTO: 2.19 X10^6/UL (ref 4.38–5.82)
SEG#(MANUAL): 0.17 X10^3/UL (ref 1.8–6.8)
SEGS% (MANUAL): 58 % (ref 42–75)

## 2021-07-05 PROCEDURE — 85025 COMPLETE CBC W/AUTO DIFF WBC: CPT

## 2021-07-05 PROCEDURE — 36415 COLL VENOUS BLD VENIPUNCTURE: CPT

## 2021-07-12 ENCOUNTER — HOSPITAL ENCOUNTER (INPATIENT)
Dept: HOSPITAL 8 - ED | Age: 76
LOS: 7 days | Discharge: HOSPICE | DRG: 812 | End: 2021-07-19
Attending: HOSPITALIST | Admitting: HOSPITALIST
Payer: MEDICARE

## 2021-07-12 VITALS — HEIGHT: 70 IN | BODY MASS INDEX: 26.8 KG/M2 | WEIGHT: 187.17 LBS

## 2021-07-12 VITALS — DIASTOLIC BLOOD PRESSURE: 56 MMHG | SYSTOLIC BLOOD PRESSURE: 96 MMHG

## 2021-07-12 VITALS — SYSTOLIC BLOOD PRESSURE: 91 MMHG | DIASTOLIC BLOOD PRESSURE: 41 MMHG

## 2021-07-12 VITALS — SYSTOLIC BLOOD PRESSURE: 93 MMHG | DIASTOLIC BLOOD PRESSURE: 46 MMHG

## 2021-07-12 VITALS — DIASTOLIC BLOOD PRESSURE: 45 MMHG | SYSTOLIC BLOOD PRESSURE: 100 MMHG

## 2021-07-12 VITALS — DIASTOLIC BLOOD PRESSURE: 49 MMHG | SYSTOLIC BLOOD PRESSURE: 108 MMHG

## 2021-07-12 VITALS — SYSTOLIC BLOOD PRESSURE: 95 MMHG | DIASTOLIC BLOOD PRESSURE: 46 MMHG

## 2021-07-12 DIAGNOSIS — I95.89: ICD-10-CM

## 2021-07-12 DIAGNOSIS — Z90.49: ICD-10-CM

## 2021-07-12 DIAGNOSIS — E87.1: ICD-10-CM

## 2021-07-12 DIAGNOSIS — G47.30: ICD-10-CM

## 2021-07-12 DIAGNOSIS — R53.81: ICD-10-CM

## 2021-07-12 DIAGNOSIS — I95.9: ICD-10-CM

## 2021-07-12 DIAGNOSIS — I50.9: ICD-10-CM

## 2021-07-12 DIAGNOSIS — K21.9: ICD-10-CM

## 2021-07-12 DIAGNOSIS — D63.8: ICD-10-CM

## 2021-07-12 DIAGNOSIS — I13.0: ICD-10-CM

## 2021-07-12 DIAGNOSIS — E44.0: ICD-10-CM

## 2021-07-12 DIAGNOSIS — D46.9: Primary | ICD-10-CM

## 2021-07-12 DIAGNOSIS — N18.30: ICD-10-CM

## 2021-07-12 DIAGNOSIS — R23.3: ICD-10-CM

## 2021-07-12 DIAGNOSIS — D61.818: ICD-10-CM

## 2021-07-12 DIAGNOSIS — Z72.89: ICD-10-CM

## 2021-07-12 DIAGNOSIS — T45.1X5A: ICD-10-CM

## 2021-07-12 LAB
<PLATELET ESTIMATE>: (no result)
<PLT MORPHOLOGY>: (no result)
ALBUMIN SERPL-MCNC: 1.9 G/DL (ref 3.4–5)
ALP SERPL-CCNC: 67 U/L (ref 45–117)
ALT SERPL-CCNC: 46 U/L (ref 12–78)
ANION GAP SERPL CALC-SCNC: 8 MMOL/L (ref 5–15)
ANISOCYTOSIS BLD QL SMEAR: (no result)
BILIRUB SERPL-MCNC: 3 MG/DL (ref 0.2–1)
CALCIUM SERPL-MCNC: 8.1 MG/DL (ref 8.5–10.1)
CHLORIDE SERPL-SCNC: 108 MMOL/L (ref 98–107)
CREAT SERPL-MCNC: 2.2 MG/DL (ref 0.7–1.3)
ERYTHROCYTE [DISTWIDTH] IN BLOOD BY AUTOMATED COUNT: 16 % (ref 9.4–14.8)
LYMPH#(MANUAL): 0.08 X10^3/UL (ref 1–3.4)
LYMPHS% (MANUAL): 42 % (ref 22–44)
MACROCYTES BLD QL SMEAR: (no result)
MCH RBC QN AUTO: 30.6 PG (ref 27.5–34.5)
MCHC RBC AUTO-ENTMCNC: 35.1 G/DL (ref 33.2–36.2)
MICROCYTES BLD QL SMEAR: (no result)
MONOS#(MANUAL): 0.02 X10^3/UL (ref 0.3–2.7)
MONOS% (MANUAL): 8 % (ref 2–9)
OVALOCYTES BLD QL SMEAR: (no result)
PLATELET # BLD AUTO: 12 X10^3/UL (ref 130–400)
PMV BLD AUTO: 8 FL (ref 7.4–10.4)
POLYCHROMASIA BLD QL SMEAR: (no result)
PROT SERPL-MCNC: 4.7 G/DL (ref 6.4–8.2)
RBC # BLD AUTO: 1.86 X10^6/UL (ref 4.38–5.82)
SEG#(MANUAL): 0.1 X10^3/UL (ref 1.8–6.8)
SEGS% (MANUAL): 50 % (ref 42–75)
TROPONIN I SERPL-MCNC: < 0.015 NG/ML (ref 0–0.04)

## 2021-07-12 PROCEDURE — 83550 IRON BINDING TEST: CPT

## 2021-07-12 PROCEDURE — 30233R1 TRANSFUSION OF NONAUTOLOGOUS PLATELETS INTO PERIPHERAL VEIN, PERCUTANEOUS APPROACH: ICD-10-PCS | Performed by: HOSPITALIST

## 2021-07-12 PROCEDURE — 86923 COMPATIBILITY TEST ELECTRIC: CPT

## 2021-07-12 PROCEDURE — 85025 COMPLETE CBC W/AUTO DIFF WBC: CPT

## 2021-07-12 PROCEDURE — 86902 BLOOD TYPE ANTIGEN DONOR EA: CPT

## 2021-07-12 PROCEDURE — 93005 ELECTROCARDIOGRAM TRACING: CPT

## 2021-07-12 PROCEDURE — 83540 ASSAY OF IRON: CPT

## 2021-07-12 PROCEDURE — 87324 CLOSTRIDIUM AG IA: CPT

## 2021-07-12 PROCEDURE — 83735 ASSAY OF MAGNESIUM: CPT

## 2021-07-12 PROCEDURE — 94660 CPAP INITIATION&MGMT: CPT

## 2021-07-12 PROCEDURE — 5A09457 ASSISTANCE WITH RESPIRATORY VENTILATION, 24-96 CONSECUTIVE HOURS, CONTINUOUS POSITIVE AIRWAY PRESSURE: ICD-10-PCS | Performed by: HOSPITALIST

## 2021-07-12 PROCEDURE — 96374 THER/PROPH/DIAG INJ IV PUSH: CPT

## 2021-07-12 PROCEDURE — 80048 BASIC METABOLIC PNL TOTAL CA: CPT

## 2021-07-12 PROCEDURE — 84484 ASSAY OF TROPONIN QUANT: CPT

## 2021-07-12 PROCEDURE — 86922 COMPATIBILITY TEST ANTIGLOB: CPT

## 2021-07-12 PROCEDURE — 96361 HYDRATE IV INFUSION ADD-ON: CPT

## 2021-07-12 PROCEDURE — 71045 X-RAY EXAM CHEST 1 VIEW: CPT

## 2021-07-12 PROCEDURE — P9037 PLATE PHERES LEUKOREDU IRRAD: HCPCS

## 2021-07-12 PROCEDURE — 86900 BLOOD TYPING SEROLOGIC ABO: CPT

## 2021-07-12 PROCEDURE — 30233N1 TRANSFUSION OF NONAUTOLOGOUS RED BLOOD CELLS INTO PERIPHERAL VEIN, PERCUTANEOUS APPROACH: ICD-10-PCS | Performed by: HOSPITALIST

## 2021-07-12 PROCEDURE — 36415 COLL VENOUS BLD VENIPUNCTURE: CPT

## 2021-07-12 PROCEDURE — P9040 RBC LEUKOREDUCED IRRADIATED: HCPCS

## 2021-07-12 PROCEDURE — 87040 BLOOD CULTURE FOR BACTERIA: CPT

## 2021-07-12 PROCEDURE — 86850 RBC ANTIBODY SCREEN: CPT

## 2021-07-12 PROCEDURE — 80053 COMPREHEN METABOLIC PANEL: CPT

## 2021-07-13 VITALS — SYSTOLIC BLOOD PRESSURE: 95 MMHG | DIASTOLIC BLOOD PRESSURE: 42 MMHG

## 2021-07-13 VITALS — DIASTOLIC BLOOD PRESSURE: 56 MMHG | SYSTOLIC BLOOD PRESSURE: 117 MMHG

## 2021-07-13 VITALS — DIASTOLIC BLOOD PRESSURE: 58 MMHG | SYSTOLIC BLOOD PRESSURE: 111 MMHG

## 2021-07-13 VITALS — SYSTOLIC BLOOD PRESSURE: 104 MMHG | DIASTOLIC BLOOD PRESSURE: 51 MMHG

## 2021-07-13 VITALS — DIASTOLIC BLOOD PRESSURE: 52 MMHG | SYSTOLIC BLOOD PRESSURE: 99 MMHG

## 2021-07-13 VITALS — DIASTOLIC BLOOD PRESSURE: 54 MMHG | SYSTOLIC BLOOD PRESSURE: 98 MMHG

## 2021-07-13 VITALS — DIASTOLIC BLOOD PRESSURE: 52 MMHG | SYSTOLIC BLOOD PRESSURE: 100 MMHG

## 2021-07-13 VITALS — DIASTOLIC BLOOD PRESSURE: 56 MMHG | SYSTOLIC BLOOD PRESSURE: 95 MMHG

## 2021-07-13 LAB
<PLATELET ESTIMATE>: (no result)
<PLT MORPHOLOGY>: (no result)
ANION GAP SERPL CALC-SCNC: 7 MMOL/L (ref 5–15)
ANISOCYTOSIS BLD QL SMEAR: (no result)
BAND#(MANUAL): 0.01 X10^3/UL
CALCIUM SERPL-MCNC: 8.3 MG/DL (ref 8.5–10.1)
CHLORIDE SERPL-SCNC: 108 MMOL/L (ref 98–107)
CREAT SERPL-MCNC: 2.01 MG/DL (ref 0.7–1.3)
ERYTHROCYTE [DISTWIDTH] IN BLOOD BY AUTOMATED COUNT: 15.5 % (ref 9.4–14.8)
LYMPH#(MANUAL): 0.05 X10^3/UL (ref 1–3.4)
LYMPHS% (MANUAL): 46 % (ref 22–44)
MACROCYTES BLD QL SMEAR: (no result)
MCH RBC QN AUTO: 30.6 PG (ref 27.5–34.5)
MCHC RBC AUTO-ENTMCNC: 35.2 G/DL (ref 33.2–36.2)
MICROCYTES BLD QL SMEAR: (no result)
MONOS#(MANUAL): 0.01 X10^3/UL (ref 0.3–2.7)
MONOS% (MANUAL): 7 % (ref 2–9)
NEUTS BAND NFR BLD: 5 % (ref 0–7)
OVALOCYTES BLD QL SMEAR: (no result)
PLATELET # BLD AUTO: 7 X10^3/UL (ref 130–400)
PMV BLD AUTO: 8.3 FL (ref 7.4–10.4)
RBC # BLD AUTO: 2.36 X10^6/UL (ref 4.38–5.82)
REACTIVE LYMPHS # (MANUAL): 0.01 X10^3/UL (ref 0–0)
REACTIVE LYMPHS % (MANUAL): 5 % (ref 0–0)
SEG#(MANUAL): 0.04 X10^3/UL (ref 1.8–6.8)
SEGS% (MANUAL): 37 % (ref 42–75)
TEAR DROPS: (no result)

## 2021-07-13 RX ADMIN — URSODIOL SCH MG: 250 TABLET ORAL at 09:51

## 2021-07-13 RX ADMIN — SODIUM CHLORIDE SCH MLS/HR: 0.9 INJECTION, SOLUTION INTRAVENOUS at 09:52

## 2021-07-13 RX ADMIN — ACETAMINOPHEN PRN MG: 325 TABLET, FILM COATED ORAL at 11:18

## 2021-07-13 RX ADMIN — URSODIOL SCH MG: 250 TABLET ORAL at 20:23

## 2021-07-14 VITALS — SYSTOLIC BLOOD PRESSURE: 113 MMHG | DIASTOLIC BLOOD PRESSURE: 58 MMHG

## 2021-07-14 VITALS — DIASTOLIC BLOOD PRESSURE: 53 MMHG | SYSTOLIC BLOOD PRESSURE: 103 MMHG

## 2021-07-14 VITALS — SYSTOLIC BLOOD PRESSURE: 108 MMHG | DIASTOLIC BLOOD PRESSURE: 56 MMHG

## 2021-07-14 VITALS — DIASTOLIC BLOOD PRESSURE: 54 MMHG | SYSTOLIC BLOOD PRESSURE: 108 MMHG

## 2021-07-14 VITALS — SYSTOLIC BLOOD PRESSURE: 113 MMHG | DIASTOLIC BLOOD PRESSURE: 68 MMHG

## 2021-07-14 VITALS — SYSTOLIC BLOOD PRESSURE: 114 MMHG | DIASTOLIC BLOOD PRESSURE: 57 MMHG

## 2021-07-14 VITALS — SYSTOLIC BLOOD PRESSURE: 120 MMHG | DIASTOLIC BLOOD PRESSURE: 59 MMHG

## 2021-07-14 VITALS — DIASTOLIC BLOOD PRESSURE: 58 MMHG | SYSTOLIC BLOOD PRESSURE: 115 MMHG

## 2021-07-14 VITALS — SYSTOLIC BLOOD PRESSURE: 108 MMHG | DIASTOLIC BLOOD PRESSURE: 55 MMHG

## 2021-07-14 VITALS — SYSTOLIC BLOOD PRESSURE: 119 MMHG | DIASTOLIC BLOOD PRESSURE: 57 MMHG

## 2021-07-14 VITALS — DIASTOLIC BLOOD PRESSURE: 54 MMHG | SYSTOLIC BLOOD PRESSURE: 103 MMHG

## 2021-07-14 VITALS — DIASTOLIC BLOOD PRESSURE: 61 MMHG | SYSTOLIC BLOOD PRESSURE: 119 MMHG

## 2021-07-14 VITALS — SYSTOLIC BLOOD PRESSURE: 107 MMHG | DIASTOLIC BLOOD PRESSURE: 66 MMHG

## 2021-07-14 LAB
% IRON SATURATION: 98 % (ref 20–55)
<PLATELET ESTIMATE>: (no result)
<PLT MORPHOLOGY>: (no result)
ANION GAP SERPL CALC-SCNC: 9 MMOL/L (ref 5–15)
ANISOCYTOSIS BLD QL SMEAR: (no result)
CALCIUM SERPL-MCNC: 7.8 MG/DL (ref 8.5–10.1)
CHLORIDE SERPL-SCNC: 110 MMOL/L (ref 98–107)
CREAT SERPL-MCNC: 1.79 MG/DL (ref 0.7–1.3)
ERYTHROCYTE [DISTWIDTH] IN BLOOD BY AUTOMATED COUNT: 16 % (ref 9.4–14.8)
IRON LEVEL: 171 MCG/DL (ref 65–175)
LYMPH#(MANUAL): 0.11 X10^3/UL (ref 1–3.4)
LYMPHS% (MANUAL): 56 % (ref 22–44)
MACROCYTES BLD QL SMEAR: (no result)
MCH RBC QN AUTO: 30.4 PG (ref 27.5–34.5)
MCHC RBC AUTO-ENTMCNC: 34.9 G/DL (ref 33.2–36.2)
MICROCYTES BLD QL SMEAR: (no result)
MONOS#(MANUAL): 0.02 X10^3/UL (ref 0.3–2.7)
MONOS% (MANUAL): 10 % (ref 2–9)
OVALOCYTES BLD QL SMEAR: (no result)
PLATELET # BLD AUTO: 6 X10^3/UL (ref 130–400)
PMV BLD AUTO: 8.8 FL (ref 7.4–10.4)
RBC # BLD AUTO: 2.25 X10^6/UL (ref 4.38–5.82)
REACTIVE LYMPHS # (MANUAL): 0.01 X10^3/UL (ref 0–0)
REACTIVE LYMPHS % (MANUAL): 4 % (ref 0–0)
SEG#(MANUAL): 0.06 X10^3/UL (ref 1.8–6.8)
SEGS% (MANUAL): 30 % (ref 42–75)
TEAR DROPS: (no result)
TIBC SERPL-MCNC: 174 MCG/DL (ref 250–450)

## 2021-07-14 RX ADMIN — URSODIOL SCH MG: 250 TABLET ORAL at 20:22

## 2021-07-14 RX ADMIN — SODIUM CHLORIDE SCH MLS/HR: 0.9 INJECTION, SOLUTION INTRAVENOUS at 04:18

## 2021-07-14 RX ADMIN — SODIUM CHLORIDE SCH MLS/HR: 0.9 INJECTION, SOLUTION INTRAVENOUS at 21:28

## 2021-07-14 RX ADMIN — DIPHENHYDRAMINE HYDROCHLORIDE PRN MG: 50 INJECTION, SOLUTION INTRAMUSCULAR; INTRAVENOUS at 09:33

## 2021-07-14 RX ADMIN — ACETAMINOPHEN PRN MG: 325 TABLET, FILM COATED ORAL at 09:33

## 2021-07-14 RX ADMIN — URSODIOL SCH MG: 250 TABLET ORAL at 09:32

## 2021-07-15 VITALS — DIASTOLIC BLOOD PRESSURE: 59 MMHG | SYSTOLIC BLOOD PRESSURE: 108 MMHG

## 2021-07-15 VITALS — SYSTOLIC BLOOD PRESSURE: 116 MMHG | DIASTOLIC BLOOD PRESSURE: 60 MMHG

## 2021-07-15 VITALS — DIASTOLIC BLOOD PRESSURE: 68 MMHG | SYSTOLIC BLOOD PRESSURE: 113 MMHG

## 2021-07-15 VITALS — DIASTOLIC BLOOD PRESSURE: 61 MMHG | SYSTOLIC BLOOD PRESSURE: 114 MMHG

## 2021-07-15 VITALS — SYSTOLIC BLOOD PRESSURE: 115 MMHG | DIASTOLIC BLOOD PRESSURE: 56 MMHG

## 2021-07-15 VITALS — SYSTOLIC BLOOD PRESSURE: 119 MMHG | DIASTOLIC BLOOD PRESSURE: 57 MMHG

## 2021-07-15 VITALS — SYSTOLIC BLOOD PRESSURE: 117 MMHG | DIASTOLIC BLOOD PRESSURE: 57 MMHG

## 2021-07-15 VITALS — DIASTOLIC BLOOD PRESSURE: 66 MMHG | SYSTOLIC BLOOD PRESSURE: 118 MMHG

## 2021-07-15 VITALS — SYSTOLIC BLOOD PRESSURE: 116 MMHG | DIASTOLIC BLOOD PRESSURE: 54 MMHG

## 2021-07-15 VITALS — DIASTOLIC BLOOD PRESSURE: 60 MMHG | SYSTOLIC BLOOD PRESSURE: 116 MMHG

## 2021-07-15 VITALS — SYSTOLIC BLOOD PRESSURE: 105 MMHG | DIASTOLIC BLOOD PRESSURE: 56 MMHG

## 2021-07-15 VITALS — DIASTOLIC BLOOD PRESSURE: 59 MMHG | SYSTOLIC BLOOD PRESSURE: 109 MMHG

## 2021-07-15 LAB
<PLATELET ESTIMATE>: (no result)
<PLT MORPHOLOGY>: (no result)
ANION GAP SERPL CALC-SCNC: 7 MMOL/L (ref 5–15)
ANISOCYTOSIS BLD QL SMEAR: (no result)
CALCIUM SERPL-MCNC: 7.5 MG/DL (ref 8.5–10.1)
CHLORIDE SERPL-SCNC: 110 MMOL/L (ref 98–107)
CREAT SERPL-MCNC: 1.49 MG/DL (ref 0.7–1.3)
ERYTHROCYTE [DISTWIDTH] IN BLOOD BY AUTOMATED COUNT: 15.6 % (ref 9.4–14.8)
LYMPH#(MANUAL): 0.05 X10^3/UL (ref 1–3.4)
LYMPHS% (MANUAL): 52 % (ref 22–44)
MACROCYTES BLD QL SMEAR: (no result)
MCH RBC QN AUTO: 30.7 PG (ref 27.5–34.5)
MCHC RBC AUTO-ENTMCNC: 35.4 G/DL (ref 33.2–36.2)
MICROCYTES BLD QL SMEAR: (no result)
MONOS#(MANUAL): 0.02 X10^3/UL (ref 0.3–2.7)
MONOS% (MANUAL): 16 % (ref 2–9)
OVALOCYTES BLD QL SMEAR: (no result)
PLATELET # BLD AUTO: 6 X10^3/UL (ref 130–400)
PMV BLD AUTO: 8.3 FL (ref 7.4–10.4)
RBC # BLD AUTO: 2.22 X10^6/UL (ref 4.38–5.82)
REACTIVE LYMPHS # (MANUAL): 0 X10^3/UL (ref 0–0)
REACTIVE LYMPHS % (MANUAL): 2 % (ref 0–0)
SEG#(MANUAL): 0.03 X10^3/UL (ref 1.8–6.8)
SEGS% (MANUAL): 30 % (ref 42–75)
TEAR DROPS: (no result)

## 2021-07-15 RX ADMIN — URSODIOL SCH MG: 250 TABLET ORAL at 08:54

## 2021-07-15 RX ADMIN — ACETAMINOPHEN PRN MG: 325 TABLET, FILM COATED ORAL at 11:08

## 2021-07-15 RX ADMIN — URSODIOL SCH MG: 250 TABLET ORAL at 20:39

## 2021-07-15 RX ADMIN — DIPHENHYDRAMINE HYDROCHLORIDE PRN MG: 50 INJECTION, SOLUTION INTRAMUSCULAR; INTRAVENOUS at 11:08

## 2021-07-15 RX ADMIN — SODIUM CHLORIDE SCH MLS/HR: 0.9 INJECTION, SOLUTION INTRAVENOUS at 17:09

## 2021-07-16 VITALS — SYSTOLIC BLOOD PRESSURE: 114 MMHG | DIASTOLIC BLOOD PRESSURE: 59 MMHG

## 2021-07-16 VITALS — SYSTOLIC BLOOD PRESSURE: 115 MMHG | DIASTOLIC BLOOD PRESSURE: 59 MMHG

## 2021-07-16 VITALS — DIASTOLIC BLOOD PRESSURE: 59 MMHG | SYSTOLIC BLOOD PRESSURE: 113 MMHG

## 2021-07-16 VITALS — DIASTOLIC BLOOD PRESSURE: 71 MMHG | SYSTOLIC BLOOD PRESSURE: 119 MMHG

## 2021-07-16 VITALS — DIASTOLIC BLOOD PRESSURE: 59 MMHG | SYSTOLIC BLOOD PRESSURE: 112 MMHG

## 2021-07-16 VITALS — DIASTOLIC BLOOD PRESSURE: 76 MMHG | SYSTOLIC BLOOD PRESSURE: 117 MMHG

## 2021-07-16 VITALS — SYSTOLIC BLOOD PRESSURE: 114 MMHG | DIASTOLIC BLOOD PRESSURE: 58 MMHG

## 2021-07-16 LAB
<PLATELET ESTIMATE>: (no result)
<PLT MORPHOLOGY>: (no result)
ANISOCYTOSIS BLD QL SMEAR: (no result)
BAND#(MANUAL): 0 X10^3/UL
CLOSTRIDIUM DIFFICILE ANTIGEN: NEGATIVE
CLOSTRIDIUM DIFFICILE TOXIN: NEGATIVE
ERYTHROCYTE [DISTWIDTH] IN BLOOD BY AUTOMATED COUNT: 15.7 % (ref 9.4–14.8)
LYMPH#(MANUAL): 0.08 X10^3/UL (ref 1–3.4)
LYMPHS% (MANUAL): 42 % (ref 22–44)
MACROCYTES BLD QL SMEAR: (no result)
MCH RBC QN AUTO: 30.4 PG (ref 27.5–34.5)
MCHC RBC AUTO-ENTMCNC: 35.2 G/DL (ref 33.2–36.2)
NEUTS BAND NFR BLD: 2 % (ref 0–7)
OVALOCYTES BLD QL SMEAR: (no result)
PLATELET # BLD AUTO: 6 X10^3/UL (ref 130–400)
PMV BLD AUTO: 9 FL (ref 7.4–10.4)
RBC # BLD AUTO: 2.29 X10^6/UL (ref 4.38–5.82)
SEG#(MANUAL): 0.11 X10^3/UL (ref 1.8–6.8)
SEGS% (MANUAL): 56 % (ref 42–75)
TEAR DROPS: (no result)

## 2021-07-16 RX ADMIN — DIPHENHYDRAMINE HYDROCHLORIDE PRN MG: 50 INJECTION, SOLUTION INTRAMUSCULAR; INTRAVENOUS at 10:28

## 2021-07-16 RX ADMIN — URSODIOL SCH MG: 250 TABLET ORAL at 20:49

## 2021-07-16 RX ADMIN — URSODIOL SCH MG: 250 TABLET ORAL at 08:54

## 2021-07-16 RX ADMIN — ACETAMINOPHEN PRN MG: 325 TABLET, FILM COATED ORAL at 10:28

## 2021-07-17 VITALS — SYSTOLIC BLOOD PRESSURE: 123 MMHG | DIASTOLIC BLOOD PRESSURE: 61 MMHG

## 2021-07-17 VITALS — SYSTOLIC BLOOD PRESSURE: 117 MMHG | DIASTOLIC BLOOD PRESSURE: 69 MMHG

## 2021-07-17 VITALS — SYSTOLIC BLOOD PRESSURE: 113 MMHG | DIASTOLIC BLOOD PRESSURE: 59 MMHG

## 2021-07-17 VITALS — SYSTOLIC BLOOD PRESSURE: 108 MMHG | DIASTOLIC BLOOD PRESSURE: 63 MMHG

## 2021-07-17 VITALS — DIASTOLIC BLOOD PRESSURE: 63 MMHG | SYSTOLIC BLOOD PRESSURE: 108 MMHG

## 2021-07-17 VITALS — SYSTOLIC BLOOD PRESSURE: 112 MMHG | DIASTOLIC BLOOD PRESSURE: 67 MMHG

## 2021-07-17 VITALS — DIASTOLIC BLOOD PRESSURE: 60 MMHG | SYSTOLIC BLOOD PRESSURE: 115 MMHG

## 2021-07-17 VITALS — DIASTOLIC BLOOD PRESSURE: 70 MMHG | SYSTOLIC BLOOD PRESSURE: 118 MMHG

## 2021-07-17 VITALS — DIASTOLIC BLOOD PRESSURE: 52 MMHG | SYSTOLIC BLOOD PRESSURE: 103 MMHG

## 2021-07-17 VITALS — SYSTOLIC BLOOD PRESSURE: 108 MMHG | DIASTOLIC BLOOD PRESSURE: 62 MMHG

## 2021-07-17 LAB
<PLATELET ESTIMATE>: (no result)
<PLT MORPHOLOGY>: (no result)
ANION GAP SERPL CALC-SCNC: 6 MMOL/L (ref 5–15)
ANISOCYTOSIS BLD QL SMEAR: (no result)
BAND#(MANUAL): 0.01 X10^3/UL
CALCIUM SERPL-MCNC: 8 MG/DL (ref 8.5–10.1)
CHLORIDE SERPL-SCNC: 110 MMOL/L (ref 98–107)
CREAT SERPL-MCNC: 1.4 MG/DL (ref 0.7–1.3)
ERYTHROCYTE [DISTWIDTH] IN BLOOD BY AUTOMATED COUNT: 15.7 % (ref 9.4–14.8)
LYMPH#(MANUAL): 0.05 X10^3/UL (ref 1–3.4)
LYMPHS% (MANUAL): 45 % (ref 22–44)
MACROCYTES BLD QL SMEAR: (no result)
MCH RBC QN AUTO: 30.6 PG (ref 27.5–34.5)
MCHC RBC AUTO-ENTMCNC: 35 G/DL (ref 33.2–36.2)
MICROCYTES BLD QL SMEAR: (no result)
MONOS#(MANUAL): 0.01 X10^3/UL (ref 0.3–2.7)
MONOS% (MANUAL): 6 % (ref 2–9)
NEUTS BAND NFR BLD: 5 % (ref 0–7)
OVALOCYTES BLD QL SMEAR: (no result)
PLATELET # BLD AUTO: 6 X10^3/UL (ref 130–400)
PMV BLD AUTO: 8.5 FL (ref 7.4–10.4)
RBC # BLD AUTO: 1.8 X10^6/UL (ref 4.38–5.82)
REACTIVE LYMPHS # (MANUAL): 0 X10^3/UL (ref 0–0)
REACTIVE LYMPHS % (MANUAL): 3 % (ref 0–0)
SEG#(MANUAL): 0.04 X10^3/UL (ref 1.8–6.8)
SEGS% (MANUAL): 41 % (ref 42–75)
TEAR DROPS: (no result)

## 2021-07-17 RX ADMIN — DIPHENHYDRAMINE HYDROCHLORIDE PRN MG: 50 INJECTION, SOLUTION INTRAMUSCULAR; INTRAVENOUS at 10:55

## 2021-07-17 RX ADMIN — URSODIOL SCH MG: 250 TABLET ORAL at 21:59

## 2021-07-17 RX ADMIN — ACETAMINOPHEN PRN MG: 325 TABLET, FILM COATED ORAL at 10:55

## 2021-07-17 RX ADMIN — SODIUM CHLORIDE SCH MLS/HR: 0.9 INJECTION, SOLUTION INTRAVENOUS at 07:29

## 2021-07-17 RX ADMIN — URSODIOL SCH MG: 250 TABLET ORAL at 09:00

## 2021-07-18 VITALS — DIASTOLIC BLOOD PRESSURE: 67 MMHG | SYSTOLIC BLOOD PRESSURE: 105 MMHG

## 2021-07-18 VITALS — SYSTOLIC BLOOD PRESSURE: 111 MMHG | DIASTOLIC BLOOD PRESSURE: 71 MMHG

## 2021-07-18 VITALS — SYSTOLIC BLOOD PRESSURE: 115 MMHG | DIASTOLIC BLOOD PRESSURE: 63 MMHG

## 2021-07-18 VITALS — DIASTOLIC BLOOD PRESSURE: 55 MMHG | SYSTOLIC BLOOD PRESSURE: 98 MMHG

## 2021-07-18 VITALS — DIASTOLIC BLOOD PRESSURE: 59 MMHG | SYSTOLIC BLOOD PRESSURE: 97 MMHG

## 2021-07-18 VITALS — DIASTOLIC BLOOD PRESSURE: 58 MMHG | SYSTOLIC BLOOD PRESSURE: 135 MMHG

## 2021-07-18 VITALS — SYSTOLIC BLOOD PRESSURE: 111 MMHG | DIASTOLIC BLOOD PRESSURE: 70 MMHG

## 2021-07-18 VITALS — SYSTOLIC BLOOD PRESSURE: 110 MMHG | DIASTOLIC BLOOD PRESSURE: 66 MMHG

## 2021-07-18 VITALS — SYSTOLIC BLOOD PRESSURE: 104 MMHG | DIASTOLIC BLOOD PRESSURE: 59 MMHG

## 2021-07-18 VITALS — SYSTOLIC BLOOD PRESSURE: 126 MMHG | DIASTOLIC BLOOD PRESSURE: 83 MMHG

## 2021-07-18 VITALS — DIASTOLIC BLOOD PRESSURE: 66 MMHG | SYSTOLIC BLOOD PRESSURE: 110 MMHG

## 2021-07-18 VITALS — SYSTOLIC BLOOD PRESSURE: 100 MMHG | DIASTOLIC BLOOD PRESSURE: 59 MMHG

## 2021-07-18 VITALS — DIASTOLIC BLOOD PRESSURE: 54 MMHG | SYSTOLIC BLOOD PRESSURE: 96 MMHG

## 2021-07-18 LAB
<PLATELET ESTIMATE>: (no result)
<PLT MORPHOLOGY>: (no result)
ALBUMIN SERPL-MCNC: 1.9 G/DL (ref 3.4–5)
ALP SERPL-CCNC: 86 U/L (ref 45–117)
ALT SERPL-CCNC: 53 U/L (ref 12–78)
ANION GAP SERPL CALC-SCNC: 8 MMOL/L (ref 5–15)
ANISOCYTOSIS BLD QL SMEAR: (no result)
BAND#(MANUAL): 0 X10^3/UL
BILIRUB SERPL-MCNC: 4.6 MG/DL (ref 0.2–1)
CALCIUM SERPL-MCNC: 8.3 MG/DL (ref 8.5–10.1)
CHLORIDE SERPL-SCNC: 110 MMOL/L (ref 98–107)
CREAT SERPL-MCNC: 1.62 MG/DL (ref 0.7–1.3)
ERYTHROCYTE [DISTWIDTH] IN BLOOD BY AUTOMATED COUNT: 15.4 % (ref 9.4–14.8)
LYMPH#(MANUAL): 0.08 X10^3/UL (ref 1–3.4)
LYMPHS% (MANUAL): 40 % (ref 22–44)
MCH RBC QN AUTO: 31.5 PG (ref 27.5–34.5)
MCHC RBC AUTO-ENTMCNC: 35.6 G/DL (ref 33.2–36.2)
MICROCYTES BLD QL SMEAR: (no result)
MONOS#(MANUAL): 0.02 X10^3/UL (ref 0.3–2.7)
MONOS% (MANUAL): 10 % (ref 2–9)
NEUTS BAND NFR BLD: 2 % (ref 0–7)
OVALOCYTES BLD QL SMEAR: (no result)
PLATELET # BLD AUTO: 8 X10^3/UL (ref 130–400)
PMV BLD AUTO: 10.2 FL (ref 7.4–10.4)
PROT SERPL-MCNC: 4.8 G/DL (ref 6.4–8.2)
RBC # BLD AUTO: 1.81 X10^6/UL (ref 4.38–5.82)
REACTIVE LYMPHS # (MANUAL): 0.01 X10^3/UL (ref 0–0)
REACTIVE LYMPHS % (MANUAL): 3 % (ref 0–0)
SEG#(MANUAL): 0.09 X10^3/UL (ref 1.8–6.8)
SEGS% (MANUAL): 45 % (ref 42–75)
TEAR DROPS: (no result)

## 2021-07-18 RX ADMIN — DIPHENHYDRAMINE HYDROCHLORIDE PRN MG: 50 INJECTION, SOLUTION INTRAMUSCULAR; INTRAVENOUS at 09:14

## 2021-07-18 RX ADMIN — ACETAMINOPHEN PRN MG: 325 TABLET, FILM COATED ORAL at 09:14

## 2021-07-18 RX ADMIN — SODIUM CHLORIDE SCH MLS/HR: 0.9 INJECTION, SOLUTION INTRAVENOUS at 00:07

## 2021-07-18 RX ADMIN — URSODIOL SCH MG: 250 TABLET ORAL at 21:40

## 2021-07-18 RX ADMIN — URSODIOL SCH MG: 250 TABLET ORAL at 08:40

## 2021-07-18 RX ADMIN — SODIUM CHLORIDE SCH MLS/HR: 0.9 INJECTION, SOLUTION INTRAVENOUS at 17:50

## 2021-07-19 VITALS — DIASTOLIC BLOOD PRESSURE: 68 MMHG | SYSTOLIC BLOOD PRESSURE: 105 MMHG

## 2021-07-19 VITALS — SYSTOLIC BLOOD PRESSURE: 92 MMHG | DIASTOLIC BLOOD PRESSURE: 44 MMHG

## 2021-07-19 RX ADMIN — SODIUM CHLORIDE SCH MLS/HR: 0.9 INJECTION, SOLUTION INTRAVENOUS at 10:30

## 2021-07-19 RX ADMIN — URSODIOL SCH MG: 250 TABLET ORAL at 09:00

## 2023-11-29 ENCOUNTER — PATIENT MESSAGE (OUTPATIENT)
Dept: HEALTH INFORMATION MANAGEMENT | Facility: OTHER | Age: 78
End: 2023-11-29
